# Patient Record
Sex: MALE | Race: WHITE | Employment: OTHER | URBAN - METROPOLITAN AREA
[De-identification: names, ages, dates, MRNs, and addresses within clinical notes are randomized per-mention and may not be internally consistent; named-entity substitution may affect disease eponyms.]

---

## 2020-09-25 ENCOUNTER — HOSPITAL ENCOUNTER (EMERGENCY)
Age: 30
Discharge: HOME | End: 2020-09-26
Attending: EMERGENCY MEDICINE
Payer: COMMERCIAL

## 2020-09-25 VITALS
DIASTOLIC BLOOD PRESSURE: 97 MMHG | WEIGHT: 220.9 LBS | BODY MASS INDEX: 32.72 KG/M2 | TEMPERATURE: 97.1 F | HEART RATE: 97 BPM | OXYGEN SATURATION: 98 % | HEIGHT: 69 IN | SYSTOLIC BLOOD PRESSURE: 155 MMHG | RESPIRATION RATE: 16 BRPM

## 2020-09-25 LAB
BASOPHILS ABSOLUTE: 0 K/UL (ref 0–0.2)
BASOPHILS RELATIVE PERCENT: 0.4 %
BILIRUBIN URINE: NEGATIVE
BLOOD, URINE: ABNORMAL
CLARITY: CLEAR
COLOR: YELLOW
EOSINOPHILS ABSOLUTE: 0.1 K/UL (ref 0–0.6)
EOSINOPHILS RELATIVE PERCENT: 1.8 %
GLUCOSE URINE: NEGATIVE MG/DL
HCT VFR BLD CALC: 44.1 % (ref 40.5–52.5)
HEMOGLOBIN: 15.3 G/DL (ref 13.5–17.5)
KETONES, URINE: NEGATIVE MG/DL
LEUKOCYTE ESTERASE, URINE: NEGATIVE
LYMPHOCYTES ABSOLUTE: 1.3 K/UL (ref 1–5.1)
LYMPHOCYTES RELATIVE PERCENT: 17.3 %
MCH RBC QN AUTO: 30.8 PG (ref 26–34)
MCHC RBC AUTO-ENTMCNC: 34.6 G/DL (ref 31–36)
MCV RBC AUTO: 89 FL (ref 80–100)
MICROSCOPIC EXAMINATION: YES
MONOCYTES ABSOLUTE: 0.4 K/UL (ref 0–1.3)
MONOCYTES RELATIVE PERCENT: 6.1 %
NEUTROPHILS ABSOLUTE: 5.4 K/UL (ref 1.7–7.7)
NEUTROPHILS RELATIVE PERCENT: 74.4 %
NITRITE, URINE: NEGATIVE
PDW BLD-RTO: 13.6 % (ref 12.4–15.4)
PH UA: 6 (ref 5–8)
PLATELET # BLD: 241 K/UL (ref 135–450)
PMV BLD AUTO: 6.4 FL (ref 5–10.5)
PROTEIN UA: NEGATIVE MG/DL
RBC # BLD: 4.95 M/UL (ref 4.2–5.9)
SPECIFIC GRAVITY UA: 1.02 (ref 1–1.03)
URINE TYPE: ABNORMAL
UROBILINOGEN, URINE: 0.2 E.U./DL
WBC # BLD: 7.2 K/UL (ref 4–11)

## 2020-09-25 PROCEDURE — 99283 EMERGENCY DEPT VISIT LOW MDM: CPT

## 2020-09-25 PROCEDURE — 36415 COLL VENOUS BLD VENIPUNCTURE: CPT

## 2020-09-25 PROCEDURE — 6370000000 HC RX 637 (ALT 250 FOR IP): Performed by: EMERGENCY MEDICINE

## 2020-09-25 PROCEDURE — 83690 ASSAY OF LIPASE: CPT

## 2020-09-25 PROCEDURE — 85025 COMPLETE CBC W/AUTO DIFF WBC: CPT

## 2020-09-25 PROCEDURE — 81001 URINALYSIS AUTO W/SCOPE: CPT

## 2020-09-25 PROCEDURE — 80053 COMPREHEN METABOLIC PANEL: CPT

## 2020-09-25 RX ORDER — QUETIAPINE FUMARATE 50 MG/1
50 TABLET, FILM COATED ORAL 2 TIMES DAILY
COMMUNITY

## 2020-09-25 RX ORDER — DICYCLOMINE HYDROCHLORIDE 10 MG/1
10 CAPSULE ORAL ONCE
Status: COMPLETED | OUTPATIENT
Start: 2020-09-25 | End: 2020-09-25

## 2020-09-25 RX ORDER — PANTOPRAZOLE SODIUM 40 MG/1
40 TABLET, DELAYED RELEASE ORAL ONCE
Status: COMPLETED | OUTPATIENT
Start: 2020-09-25 | End: 2020-09-25

## 2020-09-25 RX ADMIN — DICYCLOMINE HYDROCHLORIDE 10 MG: 10 CAPSULE ORAL at 23:44

## 2020-09-25 RX ADMIN — PANTOPRAZOLE SODIUM 40 MG: 40 TABLET, DELAYED RELEASE ORAL at 23:44

## 2020-09-25 ASSESSMENT — PAIN DESCRIPTION - DESCRIPTORS: DESCRIPTORS: CRAMPING

## 2020-09-25 ASSESSMENT — PAIN DESCRIPTION - FREQUENCY: FREQUENCY: INTERMITTENT

## 2020-09-25 ASSESSMENT — PAIN SCALES - GENERAL
PAINLEVEL_OUTOF10: 8
PAINLEVEL_OUTOF10: 7

## 2020-09-25 ASSESSMENT — PAIN DESCRIPTION - LOCATION: LOCATION: ABDOMEN

## 2020-09-25 ASSESSMENT — PAIN DESCRIPTION - ORIENTATION: ORIENTATION: OTHER (COMMENT)

## 2020-09-25 ASSESSMENT — PAIN DESCRIPTION - PAIN TYPE: TYPE: ACUTE PAIN

## 2020-09-26 LAB
A/G RATIO: 2 (ref 1.1–2.2)
ALBUMIN SERPL-MCNC: 4.8 G/DL (ref 3.4–5)
ALP BLD-CCNC: 62 U/L (ref 40–129)
ALT SERPL-CCNC: 69 U/L (ref 10–40)
ANION GAP SERPL CALCULATED.3IONS-SCNC: 14 MMOL/L (ref 3–16)
AST SERPL-CCNC: 40 U/L (ref 15–37)
BILIRUB SERPL-MCNC: 0.4 MG/DL (ref 0–1)
BUN BLDV-MCNC: 21 MG/DL (ref 7–20)
CALCIUM SERPL-MCNC: 9.7 MG/DL (ref 8.3–10.6)
CHLORIDE BLD-SCNC: 100 MMOL/L (ref 99–110)
CO2: 25 MMOL/L (ref 21–32)
CREAT SERPL-MCNC: 0.6 MG/DL (ref 0.9–1.3)
EPITHELIAL CELLS, UA: NORMAL /HPF (ref 0–5)
GFR AFRICAN AMERICAN: >60
GFR NON-AFRICAN AMERICAN: >60
GLOBULIN: 2.4 G/DL
GLUCOSE BLD-MCNC: 116 MG/DL (ref 70–99)
LIPASE: 24 U/L (ref 13–60)
POTASSIUM REFLEX MAGNESIUM: 4.1 MMOL/L (ref 3.5–5.1)
RBC UA: NORMAL /HPF (ref 0–4)
SODIUM BLD-SCNC: 139 MMOL/L (ref 136–145)
TOTAL PROTEIN: 7.2 G/DL (ref 6.4–8.2)
TRICHOMONAS: NORMAL /HPF
WBC UA: NORMAL /HPF (ref 0–5)

## 2020-09-26 RX ORDER — PANTOPRAZOLE SODIUM 20 MG/1
40 TABLET, DELAYED RELEASE ORAL DAILY
Qty: 30 TABLET | Refills: 0 | Status: ON HOLD | OUTPATIENT
Start: 2020-09-26 | End: 2021-08-14 | Stop reason: HOSPADM

## 2020-09-26 RX ORDER — DICYCLOMINE HYDROCHLORIDE 10 MG/1
10 CAPSULE ORAL EVERY 6 HOURS PRN
Qty: 20 CAPSULE | Refills: 0 | Status: ON HOLD | OUTPATIENT
Start: 2020-09-26 | End: 2021-08-13

## 2020-09-26 NOTE — ED PROVIDER NOTES
CHIEF COMPLAINT  Chief Complaint   Patient presents with    Abdominal Pain     pt c/o abd pain that started tonight after dinner and can not get it to go away       [facility]  Alex Leach is a 35 y.o. male who presents to the ED complaining of onset of periumbilical and epigastric abdominal pain that started after eating dinner tonight. Patient reports the pain is colicky and crampy and comes and goes. He reports he is currently asymptomatic. Patient reported no back pain. No testicle pain. No dysuria or hematuria. Patient had a normal bowel movement after the pain started without diarrhea, blood or melena. No fevers or chills. No chest pain or shortness of breath. No history of abdominal surgery. No other complaints, modifying factors or associated symptoms. Nursing notes reviewed. Past Medical History:   Diagnosis Date    ADHD (attention deficit hyperactivity disorder)     Autism     Depression     Fragile X syndrome     HT (hammer toe)     HTN (hypertension)     Tachycardia     Tourette syndrome      Past Surgical History:   Procedure Laterality Date    EYE SURGERY Left     TYMPANOSTOMY TUBE PLACEMENT  @ age 3     History reviewed. No pertinent family history.   Social History     Socioeconomic History    Marital status: Single     Spouse name: Not on file    Number of children: Not on file    Years of education: Not on file    Highest education level: Not on file   Occupational History    Not on file   Social Needs    Financial resource strain: Not on file    Food insecurity     Worry: Not on file     Inability: Not on file    Transportation needs     Medical: Not on file     Non-medical: Not on file   Tobacco Use    Smoking status: Never Smoker    Smokeless tobacco: Never Used   Substance and Sexual Activity    Alcohol use: No    Drug use: No    Sexual activity: Not on file   Lifestyle    Physical activity     Days per week: Not on file     Minutes per session: Not on file    Stress: Not on file   Relationships    Social connections     Talks on phone: Not on file     Gets together: Not on file     Attends Zoroastrianism service: Not on file     Active member of club or organization: Not on file     Attends meetings of clubs or organizations: Not on file     Relationship status: Not on file    Intimate partner violence     Fear of current or ex partner: Not on file     Emotionally abused: Not on file     Physically abused: Not on file     Forced sexual activity: Not on file   Other Topics Concern    Not on file   Social History Narrative    Not on file     No current facility-administered medications for this encounter. Current Outpatient Medications   Medication Sig Dispense Refill    dicyclomine (BENTYL) 10 MG capsule Take 1 capsule by mouth every 6 hours as needed (cramps) 20 capsule 0    pantoprazole (PROTONIX) 20 MG tablet Take 2 tablets by mouth daily 30 tablet 0    QUEtiapine (SEROQUEL) 100 MG tablet Take 80 mg by mouth 2 times daily      desvenlafaxine succinate (PRISTIQ) 100 MG TB24 extended release tablet Take 1 tablet by mouth daily 30 tablet 11    atomoxetine (STRATTERA) 80 MG capsule Take 1 capsule by mouth daily 30 capsule 11    fenofibrate 160 MG tablet Take 1 tablet by mouth daily 30 tablet 11    atenolol (TENORMIN) 25 MG tablet Take 1 tablet by mouth daily 30 tablet 5    butalbital-APAP-caffeine (FIORICET) -40 MG CAPS per capsule Take 1 capsule by mouth every 4 hours as needed for Headaches 30 capsule 1    promethazine (PHENERGAN) 25 MG tablet Take 1 tablet by mouth every 6 hours as needed for Nausea. 30 tablet 1    omeprazole (PRILOSEC) 20 MG capsule TAKE ONE CAPSULE BY MOUTH EVERY DAY 30 capsule 10     No Known Allergies    REVIEW OF SYSTEMS  Positives and pertinent negatives as per HPI. Ten other systems were reviewed and are negative. Nursing notes pertaining to ROS were reviewed.           PHYSICAL EXAM   BP (!) Hemoglobin 15.3 13.5 - 17.5 g/dL    Hematocrit 44.1 40.5 - 52.5 %    MCV 89.0 80.0 - 100.0 fL    MCH 30.8 26.0 - 34.0 pg    MCHC 34.6 31.0 - 36.0 g/dL    RDW 13.6 12.4 - 15.4 %    Platelets 322 898 - 979 K/uL    MPV 6.4 5.0 - 10.5 fL    Neutrophils % 74.4 %    Lymphocytes % 17.3 %    Monocytes % 6.1 %    Eosinophils % 1.8 %    Basophils % 0.4 %    Neutrophils Absolute 5.4 1.7 - 7.7 K/uL    Lymphocytes Absolute 1.3 1.0 - 5.1 K/uL    Monocytes Absolute 0.4 0.0 - 1.3 K/uL    Eosinophils Absolute 0.1 0.0 - 0.6 K/uL    Basophils Absolute 0.0 0.0 - 0.2 K/uL   Comprehensive Metabolic Panel w/ Reflex to MG   Result Value Ref Range    Sodium 139 136 - 145 mmol/L    Potassium reflex Magnesium 4.1 3.5 - 5.1 mmol/L    Chloride 100 99 - 110 mmol/L    CO2 25 21 - 32 mmol/L    Anion Gap 14 3 - 16    Glucose 116 (H) 70 - 99 mg/dL    BUN 21 (H) 7 - 20 mg/dL    CREATININE 0.6 (L) 0.9 - 1.3 mg/dL    GFR Non-African American >60 >60    GFR African American >60 >60    Calcium 9.7 8.3 - 10.6 mg/dL    Total Protein 7.2 6.4 - 8.2 g/dL    Alb 4.8 3.4 - 5.0 g/dL    Albumin/Globulin Ratio 2.0 1.1 - 2.2    Total Bilirubin 0.4 0.0 - 1.0 mg/dL    Alkaline Phosphatase 62 40 - 129 U/L    ALT 69 (H) 10 - 40 U/L    AST 40 (H) 15 - 37 U/L    Globulin 2.4 g/dL   Lipase   Result Value Ref Range    Lipase 24.0 13.0 - 60.0 U/L   Microscopic Urinalysis   Result Value Ref Range    WBC, UA 0-2 0 - 5 /HPF    RBC, UA 0-2 0 - 4 /HPF    Epithelial Cells, UA 0-1 0 - 5 /HPF    Trichomonas, UA None Seen None Seen /HPF         ED COURSE/MDM  Undifferentiated abdominal pain: Patient was given Bentyl and Protonix in the emergency room with essentially complete resolution of his symptoms. Differential diagnosis includes most likely gastritis, peptic ulcer disease, biliary colic, constipation. Patient will be given a prescription for Protonix and Bentyl for home and was advised to follow-up with [facility] surgery next 2 to 3 days if symptoms recur or are persistent. Patient does not have evidence of an acute abdomen on today's visit or other evidence of need for hospitalization. I estimate there is LOW risk for ACUTE APPENDICITIS, BOWEL OBSTRUCTION, CHOLECYSTITIS, DIVERTICULITIS, STRANGULATED HERNIA, PANCREATITIS, AAA,  TESTICULAR TORSION, PERFORATED BOWEL, PYELONEPHRITIS,  BOWEL ISCHEMIA, CARDIAC ISCHEMIA, INTRA-ABDOMINAL ABSCESS, thus I consider the discharge disposition reasonable. Also, there is no evidence or peritonitis, sepsis, or toxicity. We also discussed returning to the Emergency Department immediately if new or worsening symptoms occur. Hypertension:  Patient was hypertensive during the ER visit today. There are no signs of hypertensive emergency or evidence of end organ damage by history or physical exam.  These findings were discussed with the patient and advised to follow up with primary care physician to further assess and treat hypertension in the outpatient setting. The patient's blood pressure was found to be elevated according to CMS/Medicare and the Affordable Care Act/ObamaCare criteria. Elevated blood pressure could occur because of pain or anxiety or other reasons and does not mean that they need to have their blood pressure treated or medications otherwise adjusted. However, this could also be a sign that they will need to have their blood pressure treated or medications changed. The patient was instructed to take a list of recent blood pressure readings to their next visit with their personal physician. Patient was given scripts for the following medications. I counseled patient how to take these medications. New Prescriptions    DICYCLOMINE (BENTYL) 10 MG CAPSULE    Take 1 capsule by mouth every 6 hours as needed (cramps)    PANTOPRAZOLE (PROTONIX) 20 MG TABLET    Take 2 tablets by mouth daily         CLINICAL IMPRESSION  1.  Periumbilical abdominal pain        Blood pressure (!) 155/97, pulse 97, temperature 97.1 °F (36.2 °C), resp. rate 16, height 5' 9\" (1.753 m), weight 220 lb 14.4 oz (100.2 kg), SpO2 98 %.       Follow-up with:  Fay Matthews MD  [facility] [address] [phone]    In 2 days  If symptoms worsen    [facility]  [phone]  In 3 days  If symptoms worsen          Sourav Gaming MD  09/26/20 6846

## 2020-10-05 ENCOUNTER — HOSPITAL ENCOUNTER (OUTPATIENT)
Dept: ULTRASOUND IMAGING | Age: 30
Discharge: HOME | End: 2020-10-05
Payer: COMMERCIAL

## 2020-10-05 PROCEDURE — 76700 US EXAM ABDOM COMPLETE: CPT

## 2021-08-12 ENCOUNTER — HOSPITAL ENCOUNTER (INPATIENT)
Age: 31
LOS: 2 days | Discharge: HOME | DRG: 607 | End: 2021-08-14
Attending: EMERGENCY MEDICINE | Admitting: SPECIALIST
Payer: COMMERCIAL

## 2021-08-12 DIAGNOSIS — R21 ACUTE PURPURIC ERUPTION: Primary | ICD-10-CM

## 2021-08-12 DIAGNOSIS — R79.1 ELEVATED PARTIAL THROMBOPLASTIN TIME (PTT): ICD-10-CM

## 2021-08-12 LAB
ANION GAP SERPL CALCULATED.3IONS-SCNC: 14 MMOL/L (ref 3–16)
APTT: 43.9 SEC (ref 26.2–38.6)
BASOPHILS ABSOLUTE: 0.1 K/UL (ref 0–0.2)
BASOPHILS RELATIVE PERCENT: 1.1 %
BILIRUBIN URINE: NEGATIVE
BLOOD, URINE: ABNORMAL
BUN BLDV-MCNC: 20 MG/DL (ref 7–20)
CALCIUM SERPL-MCNC: 10.4 MG/DL (ref 8.3–10.6)
CHLORIDE BLD-SCNC: 104 MMOL/L (ref 99–110)
CLARITY: CLEAR
CO2: 20 MMOL/L (ref 21–32)
COLOR: YELLOW
CREAT SERPL-MCNC: 0.7 MG/DL (ref 0.9–1.3)
EOSINOPHILS ABSOLUTE: 0 K/UL (ref 0–0.6)
EOSINOPHILS RELATIVE PERCENT: 0.9 %
EPITHELIAL CELLS, UA: NORMAL /HPF (ref 0–5)
GFR AFRICAN AMERICAN: >60
GFR NON-AFRICAN AMERICAN: >60
GLUCOSE BLD-MCNC: 104 MG/DL (ref 70–99)
GLUCOSE URINE: NEGATIVE MG/DL
HCT VFR BLD CALC: 45.5 % (ref 40.5–52.5)
HEMOGLOBIN: 16.1 G/DL (ref 13.5–17.5)
INR BLD: 1.11 (ref 0.88–1.12)
KETONES, URINE: NEGATIVE MG/DL
LACTIC ACID: 0.8 MMOL/L (ref 0.4–2)
LEUKOCYTE ESTERASE, URINE: NEGATIVE
LYMPHOCYTES ABSOLUTE: 1.5 K/UL (ref 1–5.1)
LYMPHOCYTES RELATIVE PERCENT: 27 %
MCH RBC QN AUTO: 30.5 PG (ref 26–34)
MCHC RBC AUTO-ENTMCNC: 35.4 G/DL (ref 31–36)
MCV RBC AUTO: 86.2 FL (ref 80–100)
MICROSCOPIC EXAMINATION: YES
MONOCYTES ABSOLUTE: 0.4 K/UL (ref 0–1.3)
MONOCYTES RELATIVE PERCENT: 7.1 %
NEUTROPHILS ABSOLUTE: 3.5 K/UL (ref 1.7–7.7)
NEUTROPHILS RELATIVE PERCENT: 63.9 %
NITRITE, URINE: NEGATIVE
PDW BLD-RTO: 14.3 % (ref 12.4–15.4)
PH UA: 5.5 (ref 5–8)
PLATELET # BLD: 239 K/UL (ref 135–450)
PMV BLD AUTO: 6.6 FL (ref 5–10.5)
POTASSIUM REFLEX MAGNESIUM: 4.3 MMOL/L (ref 3.5–5.1)
PROTEIN UA: ABNORMAL MG/DL
PROTHROMBIN TIME: 12.7 SEC (ref 9.9–12.7)
RBC # BLD: 5.27 M/UL (ref 4.2–5.9)
RBC UA: NORMAL /HPF (ref 0–4)
SODIUM BLD-SCNC: 138 MMOL/L (ref 136–145)
SPECIFIC GRAVITY UA: >=1.03 (ref 1–1.03)
URINE REFLEX TO CULTURE: ABNORMAL
URINE TYPE: ABNORMAL
UROBILINOGEN, URINE: 0.2 E.U./DL
WBC # BLD: 5.5 K/UL (ref 4–11)
WBC UA: NORMAL /HPF (ref 0–5)

## 2021-08-12 PROCEDURE — 85610 PROTHROMBIN TIME: CPT

## 2021-08-12 PROCEDURE — 87040 BLOOD CULTURE FOR BACTERIA: CPT

## 2021-08-12 PROCEDURE — G0378 HOSPITAL OBSERVATION PER HR: HCPCS

## 2021-08-12 PROCEDURE — 83605 ASSAY OF LACTIC ACID: CPT

## 2021-08-12 PROCEDURE — 85025 COMPLETE CBC W/AUTO DIFF WBC: CPT

## 2021-08-12 PROCEDURE — 81001 URINALYSIS AUTO W/SCOPE: CPT

## 2021-08-12 PROCEDURE — 36415 COLL VENOUS BLD VENIPUNCTURE: CPT

## 2021-08-12 PROCEDURE — 2580000003 HC RX 258: Performed by: EMERGENCY MEDICINE

## 2021-08-12 PROCEDURE — 99284 EMERGENCY DEPT VISIT MOD MDM: CPT

## 2021-08-12 PROCEDURE — 80048 BASIC METABOLIC PNL TOTAL CA: CPT

## 2021-08-12 PROCEDURE — 85730 THROMBOPLASTIN TIME PARTIAL: CPT

## 2021-08-12 PROCEDURE — 1200000000 HC SEMI PRIVATE

## 2021-08-12 RX ORDER — 0.9 % SODIUM CHLORIDE 0.9 %
1000 INTRAVENOUS SOLUTION INTRAVENOUS ONCE
Status: COMPLETED | OUTPATIENT
Start: 2021-08-12 | End: 2021-08-12

## 2021-08-12 RX ADMIN — SODIUM CHLORIDE 1000 ML: 9 INJECTION, SOLUTION INTRAVENOUS at 21:17

## 2021-08-12 ASSESSMENT — PAIN SCALES - GENERAL
PAINLEVEL_OUTOF10: 9
PAINLEVEL_OUTOF10: 0

## 2021-08-12 ASSESSMENT — PAIN DESCRIPTION - DESCRIPTORS: DESCRIPTORS: DISCOMFORT

## 2021-08-12 ASSESSMENT — PAIN DESCRIPTION - LOCATION: LOCATION: LEG

## 2021-08-12 ASSESSMENT — PAIN DESCRIPTION - PAIN TYPE: TYPE: ACUTE PAIN

## 2021-08-12 ASSESSMENT — PAIN DESCRIPTION - ORIENTATION: ORIENTATION: LEFT;RIGHT;LOWER

## 2021-08-12 NOTE — ED NOTES
Red warm area noted to nilam lower legs inside area/ very tender/ no open skin/ peripheral pulses strong w brisk cap refill     Cady Pinedo RN  08/12/21 1949

## 2021-08-13 ENCOUNTER — APPOINTMENT (OUTPATIENT)
Dept: CT IMAGING | Age: 31
DRG: 607 | End: 2021-08-13
Payer: COMMERCIAL

## 2021-08-13 LAB
ANION GAP SERPL CALCULATED.3IONS-SCNC: 12 MMOL/L (ref 3–16)
BASOPHILS ABSOLUTE: 0 K/UL (ref 0–0.2)
BASOPHILS RELATIVE PERCENT: 1 %
BUN BLDV-MCNC: 17 MG/DL (ref 7–20)
C-REACTIVE PROTEIN: 8.9 MG/L (ref 0–5.1)
CALCIUM SERPL-MCNC: 9.6 MG/DL (ref 8.3–10.6)
CHLORIDE BLD-SCNC: 104 MMOL/L (ref 99–110)
CO2: 21 MMOL/L (ref 21–32)
CREAT SERPL-MCNC: 0.7 MG/DL (ref 0.9–1.3)
EOSINOPHILS ABSOLUTE: 0.1 K/UL (ref 0–0.6)
EOSINOPHILS RELATIVE PERCENT: 2.4 %
FIBRINOGEN: 317 MG/DL (ref 200–397)
GFR AFRICAN AMERICAN: >60
GFR NON-AFRICAN AMERICAN: >60
GLUCOSE BLD-MCNC: 119 MG/DL (ref 70–99)
HCT VFR BLD CALC: 44.4 % (ref 40.5–52.5)
HEMOGLOBIN: 15.7 G/DL (ref 13.5–17.5)
LYMPHOCYTES ABSOLUTE: 1.6 K/UL (ref 1–5.1)
LYMPHOCYTES RELATIVE PERCENT: 43.2 %
MCH RBC QN AUTO: 30.5 PG (ref 26–34)
MCHC RBC AUTO-ENTMCNC: 35.4 G/DL (ref 31–36)
MCV RBC AUTO: 86.1 FL (ref 80–100)
MONOCYTES ABSOLUTE: 0.4 K/UL (ref 0–1.3)
MONOCYTES RELATIVE PERCENT: 9.8 %
NEUTROPHILS ABSOLUTE: 1.7 K/UL (ref 1.7–7.7)
NEUTROPHILS RELATIVE PERCENT: 43.6 %
PDW BLD-RTO: 15 % (ref 12.4–15.4)
PLATELET # BLD: 208 K/UL (ref 135–450)
PMV BLD AUTO: 6.2 FL (ref 5–10.5)
POTASSIUM REFLEX MAGNESIUM: 3.7 MMOL/L (ref 3.5–5.1)
RBC # BLD: 5.16 M/UL (ref 4.2–5.9)
RHEUMATOID FACTOR: <10 IU/ML
SEDIMENTATION RATE, ERYTHROCYTE: 5 MM/HR (ref 0–15)
SODIUM BLD-SCNC: 137 MMOL/L (ref 136–145)
WBC # BLD: 3.8 K/UL (ref 4–11)

## 2021-08-13 PROCEDURE — 85732 THROMBOPLASTIN TIME PARTIAL: CPT

## 2021-08-13 PROCEDURE — 80048 BASIC METABOLIC PNL TOTAL CA: CPT

## 2021-08-13 PROCEDURE — 86147 CARDIOLIPIN ANTIBODY EA IG: CPT

## 2021-08-13 PROCEDURE — 86431 RHEUMATOID FACTOR QUANT: CPT

## 2021-08-13 PROCEDURE — 85384 FIBRINOGEN ACTIVITY: CPT

## 2021-08-13 PROCEDURE — 2580000003 HC RX 258: Performed by: SPECIALIST

## 2021-08-13 PROCEDURE — 6360000002 HC RX W HCPCS: Performed by: INTERNAL MEDICINE

## 2021-08-13 PROCEDURE — 85730 THROMBOPLASTIN TIME PARTIAL: CPT

## 2021-08-13 PROCEDURE — 6360000004 HC RX CONTRAST MEDICATION: Performed by: INTERNAL MEDICINE

## 2021-08-13 PROCEDURE — 85610 PROTHROMBIN TIME: CPT

## 2021-08-13 PROCEDURE — 86140 C-REACTIVE PROTEIN: CPT

## 2021-08-13 PROCEDURE — 2580000003 HC RX 258: Performed by: INTERNAL MEDICINE

## 2021-08-13 PROCEDURE — 6360000002 HC RX W HCPCS: Performed by: SPECIALIST

## 2021-08-13 PROCEDURE — 85613 RUSSELL VIPER VENOM DILUTED: CPT

## 2021-08-13 PROCEDURE — 85025 COMPLETE CBC W/AUTO DIFF WBC: CPT

## 2021-08-13 PROCEDURE — 99221 1ST HOSP IP/OBS SF/LOW 40: CPT | Performed by: INTERNAL MEDICINE

## 2021-08-13 PROCEDURE — 85652 RBC SED RATE AUTOMATED: CPT

## 2021-08-13 PROCEDURE — 1200000000 HC SEMI PRIVATE

## 2021-08-13 PROCEDURE — 6370000000 HC RX 637 (ALT 250 FOR IP): Performed by: INTERNAL MEDICINE

## 2021-08-13 PROCEDURE — 74177 CT ABD & PELVIS W/CONTRAST: CPT

## 2021-08-13 PROCEDURE — G0378 HOSPITAL OBSERVATION PER HR: HCPCS

## 2021-08-13 PROCEDURE — 96365 THER/PROPH/DIAG IV INF INIT: CPT

## 2021-08-13 PROCEDURE — 36415 COLL VENOUS BLD VENIPUNCTURE: CPT

## 2021-08-13 PROCEDURE — 83883 ASSAY NEPHELOMETRY NOT SPEC: CPT

## 2021-08-13 PROCEDURE — 84165 PROTEIN E-PHORESIS SERUM: CPT

## 2021-08-13 PROCEDURE — 96372 THER/PROPH/DIAG INJ SC/IM: CPT

## 2021-08-13 PROCEDURE — 85670 THROMBIN TIME PLASMA: CPT

## 2021-08-13 PROCEDURE — 86255 FLUORESCENT ANTIBODY SCREEN: CPT

## 2021-08-13 PROCEDURE — 84155 ASSAY OF PROTEIN SERUM: CPT

## 2021-08-13 PROCEDURE — 87449 NOS EACH ORGANISM AG IA: CPT

## 2021-08-13 PROCEDURE — 86038 ANTINUCLEAR ANTIBODIES: CPT

## 2021-08-13 PROCEDURE — 82232 ASSAY OF BETA-2 PROTEIN: CPT

## 2021-08-13 PROCEDURE — 96376 TX/PRO/DX INJ SAME DRUG ADON: CPT

## 2021-08-13 RX ORDER — SODIUM CHLORIDE 9 MG/ML
25 INJECTION, SOLUTION INTRAVENOUS PRN
Status: DISCONTINUED | OUTPATIENT
Start: 2021-08-13 | End: 2021-08-14 | Stop reason: HOSPADM

## 2021-08-13 RX ORDER — SODIUM CHLORIDE 0.9 % (FLUSH) 0.9 %
10 SYRINGE (ML) INJECTION PRN
Status: DISCONTINUED | OUTPATIENT
Start: 2021-08-13 | End: 2021-08-14 | Stop reason: HOSPADM

## 2021-08-13 RX ORDER — ATENOLOL 25 MG/1
25 TABLET ORAL DAILY
Status: DISCONTINUED | OUTPATIENT
Start: 2021-08-13 | End: 2021-08-14 | Stop reason: HOSPADM

## 2021-08-13 RX ORDER — SODIUM CHLORIDE 0.9 % (FLUSH) 0.9 %
10 SYRINGE (ML) INJECTION EVERY 12 HOURS SCHEDULED
Status: DISCONTINUED | OUTPATIENT
Start: 2021-08-13 | End: 2021-08-14 | Stop reason: HOSPADM

## 2021-08-13 RX ORDER — ONDANSETRON 2 MG/ML
4 INJECTION INTRAMUSCULAR; INTRAVENOUS EVERY 4 HOURS PRN
Status: DISCONTINUED | OUTPATIENT
Start: 2021-08-13 | End: 2021-08-14 | Stop reason: HOSPADM

## 2021-08-13 RX ORDER — GEMFIBROZIL 600 MG/1
600 TABLET, FILM COATED ORAL
COMMUNITY

## 2021-08-13 RX ORDER — ATOMOXETINE 40 MG/1
80 CAPSULE ORAL DAILY
Status: DISCONTINUED | OUTPATIENT
Start: 2021-08-13 | End: 2021-08-14 | Stop reason: HOSPADM

## 2021-08-13 RX ORDER — DICYCLOMINE HYDROCHLORIDE 10 MG/1
10 CAPSULE ORAL EVERY 6 HOURS PRN
Status: DISCONTINUED | OUTPATIENT
Start: 2021-08-13 | End: 2021-08-14 | Stop reason: HOSPADM

## 2021-08-13 RX ORDER — PANTOPRAZOLE SODIUM 40 MG/1
40 TABLET, DELAYED RELEASE ORAL
Status: DISCONTINUED | OUTPATIENT
Start: 2021-08-13 | End: 2021-08-14 | Stop reason: HOSPADM

## 2021-08-13 RX ORDER — QUETIAPINE FUMARATE 25 MG/1
50 TABLET, FILM COATED ORAL 2 TIMES DAILY
Status: DISCONTINUED | OUTPATIENT
Start: 2021-08-13 | End: 2021-08-14 | Stop reason: HOSPADM

## 2021-08-13 RX ORDER — ACETAMINOPHEN 650 MG/1
650 SUPPOSITORY RECTAL EVERY 4 HOURS PRN
Status: DISCONTINUED | OUTPATIENT
Start: 2021-08-13 | End: 2021-08-14 | Stop reason: HOSPADM

## 2021-08-13 RX ORDER — POLYETHYLENE GLYCOL 3350 17 G/17G
17 POWDER, FOR SOLUTION ORAL DAILY PRN
Status: DISCONTINUED | OUTPATIENT
Start: 2021-08-13 | End: 2021-08-14 | Stop reason: HOSPADM

## 2021-08-13 RX ORDER — DESVENLAFAXINE 50 MG/1
100 TABLET, EXTENDED RELEASE ORAL DAILY
Status: DISCONTINUED | OUTPATIENT
Start: 2021-08-13 | End: 2021-08-14 | Stop reason: HOSPADM

## 2021-08-13 RX ORDER — ACETAMINOPHEN 325 MG/1
650 TABLET ORAL EVERY 4 HOURS PRN
Status: DISCONTINUED | OUTPATIENT
Start: 2021-08-13 | End: 2021-08-14 | Stop reason: HOSPADM

## 2021-08-13 RX ORDER — PANTOPRAZOLE SODIUM 40 MG/1
40 TABLET, DELAYED RELEASE ORAL
Status: DISCONTINUED | OUTPATIENT
Start: 2021-08-13 | End: 2021-08-13 | Stop reason: SDUPTHER

## 2021-08-13 RX ADMIN — IOPAMIDOL 75 ML: 755 INJECTION, SOLUTION INTRAVENOUS at 17:29

## 2021-08-13 RX ADMIN — ATOMOXETINE HYDROCHLORIDE 80 MG: 40 CAPSULE ORAL at 11:28

## 2021-08-13 RX ADMIN — IOHEXOL 50 ML: 240 INJECTION, SOLUTION INTRATHECAL; INTRAVASCULAR; INTRAVENOUS; ORAL at 16:28

## 2021-08-13 RX ADMIN — PANTOPRAZOLE SODIUM 40 MG: 40 TABLET, DELAYED RELEASE ORAL at 06:27

## 2021-08-13 RX ADMIN — ENOXAPARIN SODIUM 40 MG: 40 INJECTION SUBCUTANEOUS at 08:39

## 2021-08-13 RX ADMIN — Medication 10 ML: at 21:24

## 2021-08-13 RX ADMIN — DESVENLAFAXINE SUCCINATE 100 MG: 50 TABLET, EXTENDED RELEASE ORAL at 11:28

## 2021-08-13 RX ADMIN — CEFAZOLIN SODIUM 2000 MG: 10 INJECTION, POWDER, FOR SOLUTION INTRAVENOUS at 16:13

## 2021-08-13 RX ADMIN — CEFAZOLIN SODIUM 2000 MG: 10 INJECTION, POWDER, FOR SOLUTION INTRAVENOUS at 06:49

## 2021-08-13 RX ADMIN — QUETIAPINE FUMARATE 50 MG: 25 TABLET ORAL at 21:24

## 2021-08-13 RX ADMIN — ATENOLOL 25 MG: 25 TABLET ORAL at 08:39

## 2021-08-13 RX ADMIN — Medication 10 ML: at 08:39

## 2021-08-13 RX ADMIN — QUETIAPINE FUMARATE 50 MG: 25 TABLET ORAL at 08:39

## 2021-08-13 ASSESSMENT — PAIN DESCRIPTION - ORIENTATION: ORIENTATION: RIGHT;LEFT

## 2021-08-13 ASSESSMENT — PAIN SCALES - GENERAL
PAINLEVEL_OUTOF10: 0

## 2021-08-13 ASSESSMENT — PAIN DESCRIPTION - PAIN TYPE
TYPE: ACUTE PAIN

## 2021-08-13 ASSESSMENT — PAIN DESCRIPTION - LOCATION: LOCATION: LEG

## 2021-08-13 NOTE — CONSULTS
Infectious Diseases Inpatient Consult Note      Reason for Consult:  Rash over the lower legs bi lateral     Requesting Physician:       Primary Care Physician:  MALIA Chisholm CNP    History Obtained From:  Epic and family     CHIEF COMPLAINT:     Chief Complaint   Patient presents with    Cellulitis     reports having cellulitis to nilam lower legs x 2 days         HISTORY OF PRESENT ILLNESS:  28 y.o. man with h/o Fragile X syndrome, hammertoes, hypertension, throat syndrome, ADHD, autism admitted to the hospital because of bilateral lower extremity rash tender, along the bilateral ankle area left worse than the right associated with some pain. 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 Patient denies any particular exposures no recent medication no allergies no fever or chills. No involvement of mucous membrane or eyelids or other part of the skin. Given the rash and lower extremity we are consulted for antibiotic recommendations  Location :  Left leg pain, rash around the ankle areas     Quality : aching      Severity :   5/10  Duration :   3-4 days   Timing : intermittent   Context : no trauma no new medications    Modifying factors : none  Associated signs and symptoms: no fevers no chills          Past Medical History:    Past Medical History:   Diagnosis Date    ADHD (attention deficit hyperactivity disorder)     Autism     Depression     Fragile X syndrome     HT (hammer toe)     HTN (hypertension)     Tachycardia     Tourette syndrome        Past Surgical History:    Past Surgical History:   Procedure Laterality Date    EYE SURGERY Left     TYMPANOSTOMY TUBE PLACEMENT  @ age 3       Current Medications:    No outpatient medications have been marked as taking for the 8/12/21 encounter [facility] Encounter). Allergies:  Patient has no known allergies.     Immunizations :   Immunization History   Administered Date(s) Administered    Influenza 10/01/2013 Social History:    Social History     Tobacco Use    Smoking status: Never Smoker    Smokeless tobacco: Never Used   Vaping Use    Vaping Use: Never used   Substance Use Topics    Alcohol use: No    Drug use: No     Social History     Tobacco Use   Smoking Status Never Smoker   Smokeless Tobacco Never Used      Family History  : no DVT no COPD       REVIEW OF SYSTEMS:      Constitutional:  negative for fevers, chills, night sweats  Eyes:  negative for blurred vision, eye discharge, visual disturbance   HEENT:  negative for hearing loss, ear drainage,nasal congestion  Respiratory:  negative for cough, shortness of breath or hemoptysis   Cardiovascular:  negative for chest pain, palpitations, syncope  Gastrointestinal:  negative for nausea, vomiting, diarrhea, constipation, abdominal pain  Genitourinary:  negative for frequency, dysuria, urinary incontinence, hematuria  Hematologic/Lymphatic:  negative for easy bruising, bleeding and lymphadenopathy  Allergic/Immunologic:  negative for recurrent infections, angioedema, anaphylaxis   Endocrine:  negative for weight changes, polyuria, polydipsia and polyphagia  Musculoskeletal:  negative for joint  pain, swelling, decreased range of motion  Integumentary: ++ rashes AROUND the ankle areas , skin lesions  Neurological:  negative for headaches, slurred speech, unilateral weakness  Psychiatric: negative for hallucinations,confusion,agitation.      PHYSICAL EXAM:      Vitals:    /84   Pulse 87   Temp 97.7 °F (36.5 °C) (Oral)   Resp 16   Ht 6' (1.829 m)   Wt 227 lb 9.6 oz (103.2 kg)   SpO2 97%   BMI 30.87 kg/m²     General Appearance: alert,in no acute distress, no pallor, no icterus dysmorphic features   Skin: warm and dry, no rash or erythema  Head: normocephalic and atraumatic  Eyes: pupils equal, round, and reactive to light, conjunctivae normal  ENT: tympanic membrane, external ear and ear canal normal bilaterally, nose without deformity, nasal mucosa and turbinates normal without polyps  Neck: supple and non-tender without mass, no thyromegaly  no cervical lymphadenopathy  Pulmonary/Chest: clear to auscultation bilaterally- no wheezes, rales or rhonchi, normal air movement, no respiratory distress  Cardiovascular: normal rate, regular rhythm, normal S1 and S2, no murmurs, rubs, clicks, or gallops, no carotid bruits  Abdomen: soft, non-tender, non-distended, normal bowel sounds, no masses or organomegaly  Extremities: no cyanosis, clubbing or edema  Musculoskeletal: normal range of motion, no joint swelling, deformity or tenderness  Integumentary: No rashes, no abnormal skin lesions, no petechiae  Neurologic: reflexes normal and symmetric, no cranial nerve deficit  Psych:  Orientation, sensorium, mood normal   Lines: IV  Rash around the ankle and lower par of the leg bi lateral and left worse than the right looks like Erythema nodosum as its fading away     DATA:    CBC:   Lab Results   Component Value Date    WBC 3.8 (L) 08/13/2021    HGB 15.7 08/13/2021    HCT 44.4 08/13/2021    MCV 86.1 08/13/2021     08/13/2021     RENAL:   Lab Results   Component Value Date    CREATININE 0.7 (L) 08/13/2021    BUN 17 08/13/2021     08/13/2021    K 3.7 08/13/2021     08/13/2021    CO2 21 08/13/2021     SED RATE:   Lab Results   Component Value Date    SEDRATE 5 08/13/2021     CK: No results found for: CKTOTAL  CRP:   Lab Results   Component Value Date    CRP 8.9 08/13/2021     Hepatic Function Panel:   Lab Results   Component Value Date    ALKPHOS 62 09/25/2020    ALT 69 09/25/2020    AST 40 09/25/2020    PROT 7.2 09/25/2020    PROT 7.2 01/14/2013    BILITOT 0.4 09/25/2020    LABALBU 4.8 09/25/2020     UA:  Lab Results   Component Value Date    COLORU Yellow 08/12/2021    CLARITYU Clear 08/12/2021    GLUCOSEU Negative 08/12/2021    BILIRUBINUR Negative 08/12/2021    KETUA Negative 08/12/2021    SPECGRAV >=1.030 08/12/2021    BLOODU TRACE-INTACT 08/12/2021    PHUR 5.5 08/12/2021    PROTEINU TRACE 08/12/2021    UROBILINOGEN 0.2 08/12/2021    NITRU Negative 08/12/2021    LEUKOCYTESUR Negative 08/12/2021    LABMICR YES 08/12/2021    URINETYPE NotGiven 08/12/2021      Urine Microscopic:   Lab Results   Component Value Date    WBCUA 0-2 08/12/2021    RBCUA 3-4 08/12/2021    EPIU 2-5 08/12/2021     Urine Reflex to Culture:   Lab Results   Component Value Date    URRFLXCULT Not Indicated 08/12/2021         MICRO: cultures reviewed and updated by me   12/21 2115       Order Status: Sent Specimen: Blood Updated: 08/13/21 0908   Culture, Blood 1 [[id number]] Collected: 08/12/21 2110   Order Status: Sent Specimen: Blood Updated: 08/13/21 0908         Blood Culture: No results found for: BC, BLOODCULT2    Viral Culture:    No results found for: COVID19  Urine Culture: No results for input(s): Sharri Benjamin in the last 72 hours. Scheduled Meds:   pantoprazole  40 mg Oral QAM AC    desvenlafaxine succinate  100 mg Oral Daily    atomoxetine  80 mg Oral Daily    atenolol  25 mg Oral Daily    sodium chloride flush  10 mL Intravenous 2 times per day    enoxaparin  40 mg Subcutaneous Daily    QUEtiapine  50 mg Oral BID    ceFAZolin  2,000 mg Intravenous 2 times per day       Continuous Infusions:   sodium chloride         PRN Meds:  dicyclomine, sodium chloride flush, sodium chloride, ondansetron, polyethylene glycol, acetaminophen **OR** acetaminophen    Imaging:   No orders to display       All pertinent images and reports for the current Hospitalization were reviewed by me.     IMPRESSION:    Patient Active Problem List   Diagnosis    ADHD (attention deficit hyperactivity disorder)    Tourette's syndrome    Tachycardia    GERD (gastroesophageal reflux disease)    HTN (hypertension)    Dysthymia    Cellulitis of nose    MRSA (methicillin resistant staph aureus) culture positive    Insomnia    Obesity    Abscess of nose    Rash     Rash bi lateral lower legs around the ankle area  Left leg area some what tender  CBC with diff normal  No exposures no new drugs  Rash appears to be like  Erythema Nodosum    He has no cellulitis and we can d/c IV abx and rash may be non specific as per mother he had no new medications, no allergies nor  toxins        Labs, Microbiology, Radiology and pertinent results from current hospitalization and care every where were reviewed by me as a part of the consultation. PLAN :  1. D/C Cefazolin   2. Cont local care  3. ,May need skin biopsy if  He wants to   to pursue   4. Can try low dose steroids if pain recurs    Will sign off -     Discussed with patient/Family and Nursing     Thanks for allowing me to participate in your patient's care please call me with any questions or concerns.     Dr. Julian Hernández MD  [address] [place] [address] Physician  Phone: [phone]   Fax : [phone]

## 2021-08-13 NOTE — ED NOTES
MD Zimmer into see pt and discuss plan of care and answer pt/family questions /     Shoaib Madsen RN  08/12/21 9671

## 2021-08-13 NOTE — H&P
[address] [place] [address] [facility]                              HISTORY AND PHYSICAL    PATIENT NAME: Abhishek Vaughn                    :        1990  MED REC NO:   [id number]                          ROOM:       [id number]  ACCOUNT NO:   [de-identified]                           ADMIT DATE: 2021  PROVIDER:     Shandra Ascencio MD    ATTENDING PROVIDER:  Shandra Ascencio MD    CHIEF COMPLAINT:  Rash on both lower extremities above the ankle. HISTORY OF PRESENT ILLNESS:  This 62-year-old male patient came to the  emergency room because of rash on the lower extremities above the  ankles. It is quite circumscribed like a purpuric type of rash. It  started on Tuesday, Wednesday, Thursday it got worse and the patient  came to the emergency room for evaluation. The patient is working in  [employer]. No exposure to any chemicals or any bug bites. The  patient has no fever, no other rash, just mostly on the lower  extremities. The patient denies any upper respiratory infection,  abdominal pain. No hematuria. No melena. PAST MEDICAL HISTORY:  Significant for fragile X syndrome, hammertoe,  hypertension, Tourette syndrome, ADHD, autism, and depression. ALLERGIES:  No known allergies. MEDICATIONS:  See the reconciliation sheet. No current facility-administered medications for this encounter.      Current Outpatient Medications   Medication Sig Dispense Refill    dicyclomine (BENTYL) 10 MG capsule Take 1 capsule by mouth every 6 hours as needed (cramps) 20 capsule 0    omeprazole (PRILOSEC) 20 MG delayed release capsule Take 1 capsule by mouth Daily 30 capsule 10    gemfibrozil (LOPID) 600 MG tablet Take 600 mg by mouth 2 times daily (before meals)      QUEtiapine (SEROQUEL) 50 MG tablet Take 50 mg by mouth 2 times daily       desvenlafaxine succinate (PRISTIQ) 100 MG TB24 extended release tablet Take 1 tablet by mouth daily 30 tablet 11    atomoxetine (STRATTERA) 80 MG capsule Take 1 capsule by mouth daily 30 capsule 11    atenolol (TENORMIN) 25 MG tablet Take 1 tablet by mouth daily 30 tablet 5       FAMILY HISTORY:  Significant for the patient's mother had some TTP. SOCIAL HISTORY:  The patient is single, lives with mom. Nonsmoker. Nondrinker. REVIEW OF SYSTEMS:  No headache. No visual symptom. No swallowing  problem. No confusion. No chest pain. No palpitation. No pleuritic  pain. No cough. No nausea or vomiting. No abdominal pain. No  hematuria. No melena. No weight loss. PHYSICAL EXAMINATION:  VITAL SIGNS:  The patient's blood pressure was 142/83, followup blood  pressure was 138/86; respiratory rate was 16, pulse 101, temperature  98.8, the patient weighed 226 pounds, saturation 100% on room air. GENERAL:  The patient is alert and oriented, well developed, well  nourished, moderately obese. SKIN:  On the lower extremities on both sides, circumscribed purpuric  rash about the ankles. HEENT:  Head:  Normocephalic, atraumatic. Pupils are equal, both sides,  reactive to light and accommodation. Ears, Nose, and Throat: Within  normal limits. Mucous membranes are moist.  NECK:  Supple. No JVD. No lymphadenopathy. No thyromegaly. LUNGS:  Clear bilaterally. HEART:  S1, S2.  Regular rhythm. ABDOMEN:  Obese, soft. Bowel sounds present. No mass. No  hepatosplenomegaly. EXTREMITIES:  Above the ankles and on the dorsal part of the leg, about  a 10-cm round purpuric-type of rash on both sides, symmetrical.  One is  more confluent on the left side than the other one. No other rash above  that. NEUROLOGIC:  The patient has autism and history of depression. Otherwise, the patient is alert and oriented. LABORATORY STUDIES:  WBC count was 3.8, hemoglobin 15.7, hematocrit  44.4, platelet count 134. Sodium 138, potassium 4.3, chloride 104, CO2  of 20, BUN 20, creatinine 0.7, glucose 104. Lactic acid 0.8. Differential is within normal limits. The patient's PTT was 43.9, INR  is 1.1, PT was 12.7. Urine is negative. ASSESSMENT:  Acute purpuric rash on both lower extremities, maybe mild  cellulitis, autism, ADHD, fragile X chromosome, Tourette syndrome,  borderline hypertension. PLAN:  Get Hematology and ID consult. Resume the home medication. Started on Keflex and the rest will depend upon the Hematology and ID  consult assessment. I spent more than 30 minutes on face-to-face evaluation with the  patient. I discussed the management and findings with the patient and  nursing staff.         Bubba Way MD    D: 08/13/2021 6:27:08       T: 08/13/2021 11:28:43     JOSE [email or web address]  Job#: [id number]     Doc#: [id number]    CC:

## 2021-08-13 NOTE — PLAN OF CARE
Problem: Infection:  Goal: Will remain free from infection  Description: Will remain free from infection  8/13/2021 1155 by Pricila Razo RN  Outcome: Ongoing     Problem: Safety:  Goal: Free from accidental physical injury  Description: Free from accidental physical injury  8/13/2021 1155 by Pricila Razo RN  Outcome: Ongoing  Note: Patient remains free from accidental injury. Precautions taken to ensure safe environment including bed in lowest position, wheels locked, and call light within reach. Measures taken to prevent accidental needle sticks and proper patient Identification. 8/13/2021 0351 by Babs Prince RN  Outcome: Ongoing     Problem: Safety:  Goal: Free from intentional harm  Description: Free from intentional harm  8/13/2021 1155 by Pricila Razo RN  Outcome: Ongoing     Problem: Daily Care:  Goal: Daily care needs are met  Description: Daily care needs are met  8/13/2021 1155 by Pricila Razo RN  Outcome: Ongoing  8/13/2021 0351 by Babs Prince RN  Outcome: Ongoing     Problem: Pain:  Goal: Patient's pain/discomfort is manageable  Description: Patient's pain/discomfort is manageable  8/13/2021 1155 by Pricila Razo RN  Outcome: Ongoing  Note: Pt able to express presence and absence of pain using numerical pain scale. Pt pain is managed by PRN analgesics as ordered by MD. Pain reassess after each interventions. Will continue to monitor as needed. Problem: Skin Integrity:  Goal: Skin integrity will stabilize  Description: Skin integrity will stabilize  8/13/2021 1155 by Pricila Razo RN  Outcome: Ongoing  Note: Skin assessment performed each shift per protocol. Pt encouraged to reposition often. Will continue to monitor and assess for skin breakdown.      8/13/2021 0351 by Babs Prince RN  Outcome: Ongoing     Problem: Discharge Planning:  Goal: Patients continuum of care needs are met  Description: Patients continuum of care needs are met  8/13/2021 1155 by Charles Bennett RN  Outcome: Ongoing  8/13/2021 0351 by Narda Youngblood RN  Outcome: Ongoing

## 2021-08-13 NOTE — PROGRESS NOTES
4 Eyes Skin Assessment     NAME:  Tarun Rodriguez  YOB: 1990  MEDICAL RECORD NUMBER:  [id number]    The patient is being assess for  Admission    I agree that 2 RN's have performed a thorough Head to Toe Skin Assessment on the patient. ALL assessment sites listed below have been assessed. Areas assessed by both nurses:    Head, Face, Ears, Shoulders, Back, Chest, Arms, Elbows, Hands, Sacrum. Buttock, Coccyx, Ischium and Legs. Feet and Heels        Does the Patient have a Wound?  No noted wound(s)       Carlos Prevention initiated:  No   Wound Care Orders initiated:  No    Pressure Injury (Stage 3,4, Unstageable, DTI, NWPT, and Complex wounds) if present place consult order under [de-identified] No    New and Established Ostomies if present place consult order under : NA      Nurse 1 eSignature: Electronically signed by Sravanthi Thompson RN on 8/13/21 at 4:00 AM EDT    **SHARE this note so that the co-signing nurse is able to place an eSignature**    Nurse 2 eSignature: {Esignature:[id number]}

## 2021-08-13 NOTE — CARE COORDINATION
SW attempted to reach patient via telephone to complete intake assessment, however, there was no answer. PAULINE will try again later if time permits. Respectfully submitted,    Alina [email or web address]  [facility]   [phone]    Electronically signed by BRANDIE Sprague on 8/13/2021 at 4:52 PM

## 2021-08-13 NOTE — ED NOTES
Urinal @ bedside/ aware of need for clean catch urine specimen.  Pt and family aware of [facility] plan[facility]process [facility]  [facility]  08/12/21 1155

## 2021-08-13 NOTE — PROGRESS NOTES
Pt awake in bed. Pt denies any pain or discomfort on lower legs. BLE noted to have redness/rash and it is warm at the reddened site. Pt's scheduled meds are given as ordered. Pt denies other needs at present. Call light in reach. Electronically signed by Sadie Maciel RN on 8/13/2021 at 11:54 AM

## 2021-08-13 NOTE — CARE COORDINATION
8/13 Patient not in room for initial assessment.  Electronically signed by Librado Schlatter, RN on 8/13/2021 at 4:26 PM

## 2021-08-13 NOTE — ED NOTES
MD Zimmer into discuss testing results and admission plan/process w pt and family/ pt agrees with plan of care.       Cady Pinedo RN  08/12/21 3946

## 2021-08-13 NOTE — ED PROVIDER NOTES
CHIEF COMPLAINT  Cellulitis (reports having cellulitis to nilam lower legs x 2 days)      HISTORY OF PRESENT ILLNESS  Yovany Vásquez is a 28 y.o. male who  has a past medical history of ADHD (attention deficit hyperactivity disorder), Autism, Depression, Fragile X syndrome, HT (hammer toe), HTN (hypertension), Tachycardia, and Tourette syndrome. presents to the ED with his mother for concerns of possible cellulitis bilateral lower extremities. Mother states that over the past 2 days that she has noticed redness on the medial aspect of his ankles bilaterally. States that the patient has been telling her the area of redness is a burning sensation. Mother states that they spoke with a relative who works at a medical office and was told it was possibly cellulitis. No fevers. No trauma to the area. Does have a few scabs in the area from previous bug bites but denies any recent insect bites or injuries. No other bruising or lesions on the body. No new medications. No recent antibiotics. Denies any recent upper respiratory infection, nasal congestion, cough or nausea and vomiting. No other complaints, modifying factors or associated symptoms. Nursing notes reviewed. Past Medical History:   Diagnosis Date    ADHD (attention deficit hyperactivity disorder)     Autism     Depression     Fragile X syndrome     HT (hammer toe)     HTN (hypertension)     Tachycardia     Tourette syndrome      Past Surgical History:   Procedure Laterality Date    EYE SURGERY Left     TYMPANOSTOMY TUBE PLACEMENT  @ age 3     History reviewed. No pertinent family history.   Social History     Socioeconomic History    Marital status: Single     Spouse name: Not on file    Number of children: Not on file    Years of education: Not on file    Highest education level: Not on file   Occupational History    Not on file   Tobacco Use    Smoking status: Never Smoker    Smokeless tobacco: Never Used   Vaping Use    Vaping Use: Never used   Substance and Sexual Activity    Alcohol use: No    Drug use: No    Sexual activity: Not on file   Other Topics Concern    Not on file   Social History Narrative    Not on file     Social Determinants of Health     Financial Resource Strain:     Difficulty of Paying Living Expenses:    Food Insecurity:     Worried About Running Out of Food in the Last Year:     [address] in the Last Year:    Transportation Needs:     Lack of Transportation (Medical):  Lack of Transportation (Non-Medical):    Physical Activity:     Days of Exercise per Week:     Minutes of Exercise per Session:    Stress:     Feeling of Stress :    Social Connections:     Frequency of Communication with Friends and Family:     Frequency of Social Gatherings with Friends and Family:     Attends [employer] Services:     Active Member of Clubs or Organizations:     Attends Club or Organization Meetings:     Marital Status:    Intimate Partner Violence:     Fear of Current or Ex-Partner:     Emotionally Abused:     Physically Abused:     Sexually Abused:      No current facility-administered medications for this encounter.      Current Outpatient Medications   Medication Sig Dispense Refill    dicyclomine (BENTYL) 10 MG capsule Take 1 capsule by mouth every 6 hours as needed (cramps) 20 capsule 0    pantoprazole (PROTONIX) 20 MG tablet Take 2 tablets by mouth daily 30 tablet 0    QUEtiapine (SEROQUEL) 100 MG tablet Take 80 mg by mouth 2 times daily      desvenlafaxine succinate (PRISTIQ) 100 MG TB24 extended release tablet Take 1 tablet by mouth daily 30 tablet 11    atomoxetine (STRATTERA) 80 MG capsule Take 1 capsule by mouth daily 30 capsule 11    fenofibrate 160 MG tablet Take 1 tablet by mouth daily 30 tablet 11    atenolol (TENORMIN) 25 MG tablet Take 1 tablet by mouth daily 30 tablet 5    butalbital-APAP-caffeine (FIORICET) -40 MG CAPS per capsule Take 1 capsule by mouth every 4 hours as needed for Headaches 30 capsule 1    promethazine (PHENERGAN) 25 MG tablet Take 1 tablet by mouth every 6 hours as needed for Nausea. 30 tablet 1    omeprazole (PRILOSEC) 20 MG capsule TAKE ONE CAPSULE BY MOUTH EVERY DAY 30 capsule 10     No Known Allergies      REVIEW OF SYSTEMS  10 systems reviewed, pertinent positives per HPI otherwise noted to be negative    PHYSICAL EXAM  BP (!) 144/95   Pulse 85   Temp 98.8 °F (37.1 °C) (Oral)   Resp 16   Ht 6' (1.829 m)   Wt 226 lb 3.1 oz (102.6 kg)   SpO2 99%   BMI 30.68 kg/m²      CONSTITUTIONAL: AOx4, cooperative with exam, afebrile   HEAD: normocephalic, atraumatic   EYES: PERRL, EOMI, anicteric sclera   ENT: Moist mucous membranes, uvula midline, no swelling of the posterior pharynx   NECK: Supple, symmetric, trachea midline, no meningismus   LUNGS: Bilateral breath sounds, CTAB, no rales/ronchi/wheezes   CARDIOVASCULAR: RRR, normal S1/S2, no m/r/g, 2+ pulses throughout   ABDOMEN: Soft, non-tender, non-distended, +BS   NEUROLOGIC:  MAEx4, 5/5 strength throughout; fine touch sensation intact throughout; GCS 15   MUSCULOSKELETAL: No clubbing, cyanosis or edema   SKIN:  Nonblanching tender palpable purpura medial ankles bilaterally left worse than the right, please see images below            RADIOLOGY  X-RAYS:  I have reviewed radiologic plain film image(s). ALL OTHER NON-PLAIN FILM IMAGES SUCH AS CT, ULTRASOUND AND MRI HAVE BEEN READ BY THE RADIOLOGIST. No orders to display          EKG INTERPRETATION  None    PROCEDURES    ED COURSE/MDM  ITP, TTP, vasculitis  Patient seen and evaluated. History and physical as above. Nontoxic, afebrile. Patient presents with purpura on the bilateral medial ankles is been present over the past 2 days. Lesions are nonblanching and tender. No other bruising or bleeding episodes per patient or mother. Mother does have history of thrombocytopenia herself although patient has no history himself.   They deny any recent illnesses. Plan for routine labs, liver enzymes, metabolic panel, coags, urinalysis and likely admission. ED Course as of Aug 13 0124   Thu Aug 12, 2021   2215 Patient's platelet count normal at 239. PTT slightly elevated at 43.9. PT/INR normal.  Updated patient and mother on results. Discussed admission for further work-up of patient's purpura and vasculitis. Mother agreeable. Spoke with Dr. Edgardo Torres, hospitalist at [facility] regarding patient's care plan and admission. Admission was accepted. [DS]      ED Course User Index  [DS] Rima Haines MD         Patient was given scripts for the following medications. I counseled patient how to take these medications. New Prescriptions    No medications on file     CRITICAL CARE TIME   Total Critical Care time was 20 minutes, excluding separately reportable procedures. There was a high probability of clinically significant/life threatening deterioration in the patient's condition which required my urgent intervention. CLINICAL IMPRESSION  1. Acute purpuric eruption    2. Elevated partial thromboplastin time (PTT)        Blood pressure (!) 144/95, pulse 85, temperature 98.8 °F (37.1 °C), temperature source Oral, resp. rate 16, height 6' (1.829 m), weight 226 lb 3.1 oz (102.6 kg), SpO2 99 %. DISPOSITION  Admitted      Disclaimer: All medical record entries made by [address] [place] [address] [address] [facility].       (Please note that this note was completed with a voice recognition program. Every attempt was made to edit the dictations, but inevitably there remain words that are mis-transcribed.)            Rima Haines MD  08/13/21 0127

## 2021-08-13 NOTE — PLAN OF CARE
Problem: Infection:  Goal: Will remain free from infection  Description: Will remain free from infection  Outcome: Ongoing     Problem: Pain:  Goal: Patient's pain/discomfort is manageable  Description: Patient's pain/discomfort is manageable  Outcome: Ongoing     Problem: Skin Integrity:  Goal: Skin integrity will stabilize  Description: Skin integrity will stabilize  Outcome: Ongoing

## 2021-08-13 NOTE — CONSULTS
[facility]    Consult Note      Requesting Physician: Avila Graham   CHIEF COMPLAINT: Rash on bilateral legs , elevated ptt       HISTORY OF PRESENT ILLNESS:      Mr. Eli Monsalve  is a 28 y.o. male we are seeing in consultation for a described rash of \"purpura\" and an elevated ptt.  THe pt came in after he developed an irregular bilateral erythematous patch on his legs  It is tender somewhat to palpation   It does not kali and its minorly raised   He denies recent fevers/sick contacts-he may have had a brief episode of diarrhea but not bloody or persistant  The pts cbc is normal with a normal plt count  Denies new abx or meds -no change in meds  No joint swelling   NO dysuria sore throat/urine color changes or abd pain   NO cough or sob   NO sun sensitivity   He has baseline health issues related to fragile x/autism/tourettes  He works at a store  He has no animal bites  No travel no night sweats or adenopathy   HE has no spontaneous bleeding   No hx of surgical bleeding   Pt had an elevated ptt in er ,normal pt   No headaches neck stiffness or neurologic complaints     Past Medical History:        Diagnosis Date    ADHD (attention deficit hyperactivity disorder)     Autism     Depression     Fragile X syndrome     HT (hammer toe)     HTN (hypertension)     Tachycardia     Tourette syndrome      Past Surgical History:        Procedure Laterality Date    EYE SURGERY Left     TYMPANOSTOMY TUBE PLACEMENT  @ age 3       Current Medications:    Current Facility-Administered Medications: pantoprazole (PROTONIX) tablet 40 mg, 40 mg, Oral, QAM AC  dicyclomine (BENTYL) capsule 10 mg, 10 mg, Oral, Q6H PRN  desvenlafaxine succinate (PRISTIQ) extended release tablet 100 mg, 100 mg, Oral, Daily  atomoxetine (STRATTERA) capsule 80 mg, 80 mg, Oral, Daily  atenolol (TENORMIN) tablet 25 mg, 25 mg, Oral, Daily  sodium chloride flush 0.9 % injection 10 mL, 10 mL, Intravenous, 2 times per day  sodium chloride flush 0.9 % injection 10 mL, 10 mL, Intravenous, PRN  0.9 % sodium chloride infusion, 25 mL, Intravenous, PRN  ondansetron (ZOFRAN) injection 4 mg, 4 mg, Intravenous, Q4H PRN  polyethylene glycol (GLYCOLAX) packet 17 g, 17 g, Oral, Daily PRN  acetaminophen (TYLENOL) tablet 650 mg, 650 mg, Oral, Q4H PRN **OR** acetaminophen (TYLENOL) suppository 650 mg, 650 mg, Rectal, Q4H PRN  enoxaparin (LOVENOX) injection 40 mg, 40 mg, Subcutaneous, Daily  QUEtiapine (SEROQUEL) tablet 50 mg, 50 mg, Oral, BID  iohexol (OMNIPAQUE 240) injection 50 mL, 50 mL, Intravenous, ONCE PRN  iohexol (OMNIPAQUE 240) 240 MG/ML injection, , ,   Allergies:  Patient has no known allergies. Social History:      Social History     Socioeconomic History    Marital status: Single     Spouse name: Not on file    Number of children: Not on file    Years of education: Not on file    Highest education level: Not on file   Occupational History    Not on file   Tobacco Use    Smoking status: Never Smoker    Smokeless tobacco: Never Used   Vaping Use    Vaping Use: Never used   Substance and Sexual Activity    Alcohol use: No    Drug use: No    Sexual activity: Not on file   Other Topics Concern    Not on file   Social History Narrative    Not on file     Social Determinants of Health     Financial Resource Strain:     Difficulty of Paying Living Expenses:    Food Insecurity:     Worried About Running Out of Food in the Last Year:     [address] in the Last Year:    Transportation Needs:     Lack of Transportation (Medical):      Lack of Transportation (Non-Medical):    Physical Activity:     Days of Exercise per Week:     Minutes of Exercise per Session:    Stress:     Feeling of Stress :    Social Connections:     Frequency of Communication with Friends and Family:     Frequency of Social Gatherings with Friends and Family:     Attends [employer] Services:     Active Member of Clubs or Organizations:     Attends [employer] or Organization Meetings:     Marital Status:    Intimate Partner Violence:     Fear of Current or Ex-Partner:     Emotionally Abused:     Physically Abused:     Sexually Abused:           Family History:     History reviewed. No pertinent family history. REVIEW OF SYSTEMS:    ROS per the HPI   Otherwise  10 point ROS negative     PHYSICAL EXAM:      Vitals:  /80   Pulse 84   Temp 98.3 °F (36.8 °C) (Oral)   Resp 18   Ht 6' (1.829 m)   Wt 227 lb 9.6 oz (103.2 kg)   SpO2 96%   BMI 30.87 kg/m²     CONSTITUTIONAL:  awake, alert, cooperative, no apparent distress, and appears stated age NAD  EYES:  pupils equal, round and reactive to light, extra ocular muscles intact, sclera clear, conjunctiva normal  NECK:  Supple, symmetrical, trachea midline, no adenopathy, thyroid symmetric, not enlarged and no tenderness, skin normal  HEMATOLOGIC/LYMPHATICS:  no cervical lymphadenopathy, no supraclavicular lymphadenopathy, no axillary lymphadenopathyLUNGS:  No increased work of breathing, good air exchange, clear to auscultation bilaterally, no crackles or wheezing  CARDIOVASCULAR:  , regular rate and rhythm, normal S1 and S2, no S3 or S4, and no murmur noted  ABDOMEN:  No scars, normal bowel sounds, soft, non-distended, non-tender, no masses palpated, no hepatosplenomegally      MUSCULOSKELETAL:  There is no redness, warmth, or swelling of the joints. Full range of motion noted. Motor strength is 5 out of 5 all extremities bilaterally. NEUROLOGIC:  Awake, alert, oriented to name, place and time. Cranial nerves II-XII are grossly intact. Motor is 5 out of 5 bilaterally.   SKIN:bilateral patchy irregular erythema -not raised or open-somewhat painful to touch   DATA:    PT/INR:    Recent Labs     08/12/21 2115 08/13/21 1624 08/13/21  1625   PROT  --  7.2  --    INR 1.11  --  1.10     PTT:    Recent Labs     08/12/21 2115 08/13/21 1625   APTT 43.9* 46.8*     CMP:    Lab Results   Component Value Date    NA 137 08/13/2021    K 3.7 08/13/2021     08/13/2021    CO2 21 08/13/2021    BUN 17 08/13/2021    PROT 7.2 08/13/2021    PROT 7.2 01/14/2013     Magnesium:  No results found for: MG  Phosphorus:  No components found for: PO4  Calcium:  No components found for: CA  CBC:    Lab Results   Component Value Date    WBC 3.8 08/13/2021    RBC 5.16 08/13/2021    HGB 15.7 08/13/2021    HCT 44.4 08/13/2021    MCV 86.1 08/13/2021    RDW 15.0 08/13/2021     08/13/2021     DIFF:    Lab Results   Component Value Date    MCV 86.1 08/13/2021    RDW 15.0 08/13/2021      LDH:  @labcrnt(LDH)@  Uric Acid:  @labcrnt(URIC)@    Radiology Review: CT CHEST ABDOMEN PELVIS W CONTRAST    Result Date: 8/13/2021  EXAMINATION: CT OF THE CHEST, ABDOMEN, AND PELVIS WITH CONTRAST 8/13/2021 11:19 am TECHNIQUE: CT of the chest, abdomen and pelvis was performed with the administration of intravenous contrast. Multiplanar reformatted images are provided for review. Dose modulation, iterative reconstruction, and/or weight based adjustment of the mA/kV was utilized to reduce the radiation dose to as low as reasonably achievable. COMPARISON: None HISTORY: ORDERING SYSTEM PROVIDED HISTORY: eval for adenopathy -may have erythema nodosum TECHNOLOGIST PROVIDED HISTORY: Reason for exam:->eval for adenopathy -may have erythema nodosum Additional Contrast?->Oral Reason for Exam: eval for adenopathy -may have erythema nodosum Acuity: Acute Type of Exam: Initial FINDINGS: Chest: Mediastinum: No pathological adenopathy is present within the mediastinum. No acute abnormalities present involving the thoracic aorta or heart. Thyroid gland appears normal.  Main pulmonary artery is normal in caliber. Lungs/pleura: No focal area of consolidation, pleural effusion, or pneumothorax is present. No suspicious lung nodules are present. Soft Tissues/Bones: Benign-appearing axillary lymph nodes are present bilaterally.   No acute soft tissue or osseous abnormality is present. Abdomen/Pelvis: Organs: Diffuse hepatic steatosis is present. Spleen is normal in size. The gallbladder, pancreas, adrenal glands, and kidneys appear normal. GI/Bowel: Stomach is unremarkable. Small bowel appears normal without evidence of obstruction. The appendix is normal.  Sigmoid diverticulosis is present without evidence of diverticulitis. Pelvis: Urinary bladder and prostate gland appear grossly normal.  Bilateral inguinal hernias are present containing fat. Peritoneum/Retroperitoneum: No free fluid or free air is present within the abdomen or pelvis. Scattered mesenteric lymph nodes are present, and are nonspecific. No retroperitoneal adenopathy is present. No aneurysm formation is present. Bones/Soft Tissues: No acute osseous abnormality is present. 1. No pathological adenopathy present within the chest 2. No evidence of acute cardiopulmonary disease 3. Scattered, nonspecific mesenteric lymph nodes. Differential considerations would include mesenteric panniculitis. Follow-up imaging in 6 months recommended to confirm stability.  4. Diffuse hepatic steatosis         Problem List  Patient Active Problem List   Diagnosis    ADHD (attention deficit hyperactivity disorder)    Tourette's syndrome    Tachycardia    GERD (gastroesophageal reflux disease)    HTN (hypertension)    Dysthymia    Cellulitis of nose    MRSA (methicillin resistant staph aureus) culture positive    Insomnia    Obesity    Abscess of nose    Rash       IMPRESSION/RECOMMENDATIONS:    Bilateral erythematous rash-2 patches on his lower legs/elevated ptt     Does not seem to kali   pts rash seems non specific as an initial impression   He has a normal plt count   Cbc does not suggest a blood prob related to wbc/etc   ptt elevated-may be false will repeat but with mixing study and lupus anticoagulant as this could explain the elevated ptt-as he has no bleeding hx  THis does not strike me as a factor 8 inhiitor clinically-this is much more like exaggerated bruises or spontaneous bleeding thus clinical suspicion is low for a bleeding diathesis -my suspect is that he will have a pos lupus anticoagulant for the elevated ptt     ? Vasculitis? -I ordered some labs for this -  Post infecious?  History of diarrhea but not severe  This does not seem cellulitc to me -  Does not remind me of erythema nodosum but to be sure will make srue no adenopathy -scans ordered -this may be overkill but at times this can be sneaky -and sometimes erythema nodusum can be associated with lymphoma -  Anca/nan ordered   I do not suspect Henoch-schonlein purpura based on exam and neg other findings  IF persists -skin bx but this can only usually happen as outpt

## 2021-08-13 NOTE — ED NOTES
[facility] for transport team 0130 per [facility] [place] [facility]. Pt and family updated.       Janiya Cole RN  08/12/21 7571

## 2021-08-13 NOTE — H&P
H & P dictated  227 [id number]  Acute purpuric rash, eruptions, symmetrical on both LE above the ankle. Cause not clear. Mild cellulitis  Fragile X chromosome,  ADHD  Autism,  Tourette's syndrome  Get ID and Hem/Onc consult, resume home meds. Rest of therapy depend upon Hem and ID eval, assessment.   Dr Phu Oro

## 2021-08-13 NOTE — PROGRESS NOTES
Patient transferred from [facility] ED. Patient ambulated from stretcher to bed without assistance. VSS. Mother will be at bedside with patient. Patient alert and orient, call light in reach. will continue to monitor.

## 2021-08-14 VITALS
BODY MASS INDEX: 30.77 KG/M2 | HEART RATE: 81 BPM | WEIGHT: 227.2 LBS | RESPIRATION RATE: 16 BRPM | OXYGEN SATURATION: 89 % | SYSTOLIC BLOOD PRESSURE: 134 MMHG | HEIGHT: 72 IN | TEMPERATURE: 97.7 F | DIASTOLIC BLOOD PRESSURE: 88 MMHG

## 2021-08-14 LAB
ANION GAP SERPL CALCULATED.3IONS-SCNC: 12 MMOL/L (ref 3–16)
ANTI-NUCLEAR ANTIBODY (ANA): NEGATIVE
APTT: 46.8 SEC (ref 26.2–38.6)
BASOPHILS ABSOLUTE: 0 K/UL (ref 0–0.2)
BASOPHILS RELATIVE PERCENT: 0.8 %
BUN BLDV-MCNC: 15 MG/DL (ref 7–20)
CALCIUM SERPL-MCNC: 8.7 MG/DL (ref 8.3–10.6)
CHLORIDE BLD-SCNC: 104 MMOL/L (ref 99–110)
CO2: 22 MMOL/L (ref 21–32)
CREAT SERPL-MCNC: 0.6 MG/DL (ref 0.9–1.3)
EOSINOPHILS ABSOLUTE: 0.1 K/UL (ref 0–0.6)
EOSINOPHILS RELATIVE PERCENT: 3.4 %
GFR AFRICAN AMERICAN: >60
GFR NON-AFRICAN AMERICAN: >60
GLUCOSE BLD-MCNC: 115 MG/DL (ref 70–99)
HCT VFR BLD CALC: 42.3 % (ref 40.5–52.5)
HEMOGLOBIN: 15 G/DL (ref 13.5–17.5)
INR BLD: 1.1 (ref 0.88–1.12)
LYMPHOCYTES ABSOLUTE: 1.4 K/UL (ref 1–5.1)
LYMPHOCYTES RELATIVE PERCENT: 40.9 %
MCH RBC QN AUTO: 30.4 PG (ref 26–34)
MCHC RBC AUTO-ENTMCNC: 35.4 G/DL (ref 31–36)
MCV RBC AUTO: 85.9 FL (ref 80–100)
MONOCYTES ABSOLUTE: 0.3 K/UL (ref 0–1.3)
MONOCYTES RELATIVE PERCENT: 10.2 %
NEUTROPHILS ABSOLUTE: 1.5 K/UL (ref 1.7–7.7)
NEUTROPHILS RELATIVE PERCENT: 44.7 %
PDW BLD-RTO: 14.8 % (ref 12.4–15.4)
PLATELET # BLD: 189 K/UL (ref 135–450)
PMV BLD AUTO: 6.4 FL (ref 5–10.5)
POTASSIUM REFLEX MAGNESIUM: 3.9 MMOL/L (ref 3.5–5.1)
PROTHROMBIN TIME: 12.5 SEC (ref 9.9–12.7)
PTT 1-HOUR, 1:1 MIX: 39.8 SEC (ref 24.1–34.9)
PTT 1:1 MIX IMMEDIATE: 40.2 SEC (ref 24.1–34.9)
PTT MIXING STUDY INDICATED: YES
RBC # BLD: 4.92 M/UL (ref 4.2–5.9)
SODIUM BLD-SCNC: 138 MMOL/L (ref 136–145)
STREP PNEUMONIAE ANTIGEN, URINE: NORMAL
THROMBIN TIME: 13.4 SECS (ref 12.1–17.5)
WBC # BLD: 3.3 K/UL (ref 4–11)

## 2021-08-14 PROCEDURE — 85025 COMPLETE CBC W/AUTO DIFF WBC: CPT

## 2021-08-14 PROCEDURE — G0378 HOSPITAL OBSERVATION PER HR: HCPCS

## 2021-08-14 PROCEDURE — 2580000003 HC RX 258: Performed by: INTERNAL MEDICINE

## 2021-08-14 PROCEDURE — 36415 COLL VENOUS BLD VENIPUNCTURE: CPT

## 2021-08-14 PROCEDURE — 6370000000 HC RX 637 (ALT 250 FOR IP): Performed by: INTERNAL MEDICINE

## 2021-08-14 PROCEDURE — 96372 THER/PROPH/DIAG INJ SC/IM: CPT

## 2021-08-14 PROCEDURE — 6360000002 HC RX W HCPCS: Performed by: INTERNAL MEDICINE

## 2021-08-14 PROCEDURE — 80048 BASIC METABOLIC PNL TOTAL CA: CPT

## 2021-08-14 RX ORDER — DICYCLOMINE HYDROCHLORIDE 10 MG/1
10 CAPSULE ORAL EVERY 6 HOURS PRN
Qty: 20 CAPSULE | Refills: 0 | Status: SHIPPED | OUTPATIENT
Start: 2021-08-14

## 2021-08-14 RX ORDER — OMEPRAZOLE 20 MG/1
20 CAPSULE, DELAYED RELEASE ORAL DAILY
Qty: 30 CAPSULE | Refills: 10 | Status: SHIPPED | OUTPATIENT
Start: 2021-08-14

## 2021-08-14 RX ADMIN — DESVENLAFAXINE SUCCINATE 100 MG: 50 TABLET, EXTENDED RELEASE ORAL at 08:13

## 2021-08-14 RX ADMIN — ATOMOXETINE HYDROCHLORIDE 80 MG: 40 CAPSULE ORAL at 08:13

## 2021-08-14 RX ADMIN — PANTOPRAZOLE SODIUM 40 MG: 40 TABLET, DELAYED RELEASE ORAL at 05:30

## 2021-08-14 RX ADMIN — ENOXAPARIN SODIUM 40 MG: 40 INJECTION SUBCUTANEOUS at 08:13

## 2021-08-14 RX ADMIN — QUETIAPINE FUMARATE 50 MG: 25 TABLET ORAL at 08:13

## 2021-08-14 RX ADMIN — Medication 10 ML: at 08:13

## 2021-08-14 RX ADMIN — ATENOLOL 25 MG: 25 TABLET ORAL at 08:13

## 2021-08-14 ASSESSMENT — PAIN SCALES - GENERAL
PAINLEVEL_OUTOF10: 0
PAINLEVEL_OUTOF10: 0

## 2021-08-14 ASSESSMENT — PAIN DESCRIPTION - PAIN TYPE: TYPE: ACUTE PAIN

## 2021-08-14 ASSESSMENT — PAIN DESCRIPTION - LOCATION: LOCATION: LEG

## 2021-08-14 ASSESSMENT — PAIN DESCRIPTION - ORIENTATION: ORIENTATION: RIGHT;LEFT

## 2021-08-14 NOTE — DISCHARGE SUMMARY
[address] [facility]                               DISCHARGE SUMMARY    PATIENT NAME: Saroj Bennett                    :        1990  MED REC NO:   [id number]                          ROOM:       4127  ACCOUNT NO:   [de-identified]                           ADMIT DATE: 2021  PROVIDER:     Jhonathan Damon MD                  DISCHARGE DATE:  2021        DISCHARGE DIAGNOSES:  Acute purpuric rash on both lower extremities,  TTP, Schonlein-Henoch purpura was ruled out, ADHD, Tourette syndrome,  hepatic steatosis, GERD, borderline hypertension, obesity. The patient  also has fragile X chromosome and autism. DISCHARGE SUMMARY:  This 81-year-old male patient came to the emergency  room because of the acute rash on both lower extremities behind the  legs, above the ankles. It happened over two days. The patient was  afebrile. No history of bug bites or exposure to any chemicals. The  patient was seen by Hematology. TTP, Schonlein-Henoch purpura, other  serious illnesses were ruled out. The patient had a minor elevated PTP. The patient was afebrile, was also seen by ID. It was not cellulitis. At the present time, the rash cause is unknown. The patient is going to  follow up with Dermatology as outpatient, needs a punch biopsy. The  patient remained afebrile, doing fine. Antibiotic was stopped. The  patient can be discharged and follow up as outpatient basis in one week,  follow up with PCP and Dermatology. CONDITION ON DISCHARGE:  Stable. COMPLICATIONS:  None. DISCHARGE MEDICATIONS:  See the reconciliation sheet. No current facility-administered medications for this encounter.      Current Outpatient Medications   Medication Sig Dispense Refill    dicyclomine (BENTYL) 10 MG capsule Take 1 capsule by mouth every 6 hours as needed (cramps) 20 capsule 0    omeprazole (PRILOSEC) 20 MG delayed release capsule Take 1 capsule by mouth Daily 30 capsule 10    gemfibrozil (LOPID) 600 MG tablet Take 600 mg by mouth 2 times daily (before meals)      QUEtiapine (SEROQUEL) 50 MG tablet Take 50 mg by mouth 2 times daily       desvenlafaxine succinate (PRISTIQ) 100 MG TB24 extended release tablet Take 1 tablet by mouth daily 30 tablet 11    atomoxetine (STRATTERA) 80 MG capsule Take 1 capsule by mouth daily 30 capsule 11    atenolol (TENORMIN) 25 MG tablet Take 1 tablet by mouth daily 30 tablet 5     FOLLOWUP:  The patient will follow up with PCP and Dermatology. I spent more than 30 minutes with the discharge instruction and  paperwork.         Sergio Pedersen MD    D: 08/14/2021 7:34:03       T: 08/14/2021 16:20:21     JOSE [email or web address]  Job#: [id number]     Doc#: [id number]    CC:

## 2021-08-14 NOTE — PROGRESS NOTES
All verbal and written discharge instructions given to pt and pt's mother at bedside regarding new meds and changes in home meds, and follow up appointment. Pt and family verbalized understandings. Denies other needs at discharge.  Electronically signed by Amilcar Gardner RN on 8/14/2021 at 11:32 AM

## 2021-08-14 NOTE — PROGRESS NOTES
ONCOLOGY HEMATOLOGY CARE  PROGRESS NOTE    SUBJECTIVE:       Pt is eating breakfast very well. Mother at bedside, reports that his spots on his legs are no worse, no spots any place else, no epistaxis, gum bleeding, hematemesis, rectal bleeding, gross hematuria, but she said his urinalysis shows microscopic hematuria. She also says that when is outside in the sun, he gets redness under his eyes on both cheeks, and when he is in shower, his feet turn red, no pain. She is a patient of Dr. Justen Akins, appointment on Mon. PHYSICAL EXAM:       /88   Pulse 81   Temp 97.7 °F (36.5 °C) (Oral)   Resp 16   Ht 6' (1.829 m)   Wt 227 lb 3.2 oz (103.1 kg)   SpO2 (!) 89%   BMI 30.81 kg/m²     General appearance: Alert and cooperative. Appears stated age. Comfortable. Obese  Head: Normocephalic, without obvious abnormality, atraumatic  EENT:  No icterus. No mucositis. Neck: No JVD. Lymph Nodes:  No palpable lymph nodes in the cervical, supraclavicular, axillary areas. Extremities: without cyanosis, clubbing, edema  Skin: Red patchy flat ?plaques on medial aspect of both legs just above ankles, no tenderness, does not jessica, not raised. Musculoskeletal:  No joint swelling, deformities, tenderness, erythema. Neuro:  Alert and oriented. Cranial nerves are intact. He doesn't really answer questions, mostly his mother does.   Psychiatric:  Normal    MEDS:     Current Facility-Administered Medications   Medication Dose Route Frequency Provider Last Rate Last Admin    pantoprazole (PROTONIX) tablet 40 mg  40 mg Oral QAM AC Carmen Blackwell MD   40 mg at 08/14/21 0530    dicyclomine (BENTYL) capsule 10 mg  10 mg Oral Q6H PRN Carmen Blackwell MD        desvenlafaxine succinate (PRISTIQ) extended release tablet 100 mg  100 mg Oral Daily Carmen Blackwell MD   100 mg at 08/14/21 0813    atomoxetine (STRATTERA) capsule 80 mg  80 mg Oral Daily Carmen Blackwell MD   80 mg at 08/14/21 0813    atenolol (TENORMIN) tablet 25 mg  25 mg Oral Daily Kylah López MD   25 mg at 08/14/21 0813    sodium chloride flush 0.9 % injection 10 mL  10 mL Intravenous 2 times per day Kylah López MD   10 mL at 08/14/21 0813    sodium chloride flush 0.9 % injection 10 mL  10 mL Intravenous PRN Kylah López MD        0.9 % sodium chloride infusion  25 mL Intravenous PRN Kylah López MD        ondansetron [facility]) injection 4 mg  4 mg Intravenous Q4H PRN Kylah López MD        polyethylene glycol [facility]) packet 17 g  17 g Oral Daily PRN Kylah López MD        acetaminophen (TYLENOL) tablet 650 mg  650 mg Oral Q4H PRN Kylah López MD        Or    acetaminophen (TYLENOL) suppository 650 mg  650 mg Rectal Q4H PRN Kylah López MD        enoxaparin (LOVENOX) injection 40 mg  40 mg Subcutaneous Daily Mavis Parents, MD   40 mg at 08/14/21 0813    QUEtiapine (SEROQUEL) tablet 50 mg  50 mg Oral BID Kylah López MD   50 mg at 08/14/21 0813    iohexol (OMNIPAQUE 240) injection 50 mL  50 mL Intravenous ONCE PRN Saba Niño MD           LABS:     CBC:   Lab Results   Component Value Date    WBC 3.3 (L) 08/14/2021    HGB 15.0 08/14/2021    HCT 42.3 08/14/2021    MCV 85.9 08/14/2021     08/14/2021    LYMPHOPCT 40.9 08/14/2021    RBC 4.92 08/14/2021    MCH 30.4 08/14/2021    MCHC 35.4 08/14/2021    RDW 14.8 08/14/2021    NEUTOPHILPCT 44.7 08/14/2021    MONOPCT 10.2 08/14/2021    BASOPCT 0.8 08/14/2021    NEUTROABS 1.5 (L) 08/14/2021    LYMPHSABS 1.4 08/14/2021    MONOSABS 0.3 08/14/2021    EOSABS 0.1 08/14/2021    BASOSABS 0.0 08/14/2021       CMP:   Lab Results   Component Value Date     08/14/2021    K 3.9 08/14/2021     08/14/2021    CO2 22 08/14/2021    BUN 15 08/14/2021    CREATININE 0.6 08/14/2021    GLUCOSE 115 08/14/2021    CALCIUM 8.7 08/14/2021    PROT 7.2 08/13/2021    PROT 7.2 01/14/2013    LABALBU 4.8 09/25/2020    BILITOT 0.4 09/25/2020    ALKPHOS 62 09/25/2020    AST 40 09/25/2020    ALT 69 09/25/2020      Results for Ivory Mclean (MRN [id number]) as of 8/14/2021 08:34   Ref. Range 8/13/2021 16:25   Rheumatoid Factor Latest Ref Range: <14 IU/mL <10.0       Results for Ivory Mclean (MRN [id number]) as of 8/14/2021 08:34   Ref. Range 8/13/2021 16:25   Prothrombin Time Latest Ref Range: 9.9 - 12.7 sec 12.5   INR Latest Ref Range: 0.88 - 1.12  1.10   Fibrinogen Latest Ref Range: 200 - 397 mg/dL 317   aPTT Latest Ref Range: 26.2 - 38.6 sec 46.8 (H)     IMAGING:         ASSESSMENT:         Patient Active Problem List   Diagnosis Code    ADHD (attention deficit hyperactivity disorder) F90.9    Tourette's syndrome F95.2    Tachycardia R00.0    GERD (gastroesophageal reflux disease) K21.9    HTN (hypertension) I10    Dysthymia F34.1    Cellulitis of nose J34.0    MRSA (methicillin resistant staph aureus) culture positive Z22.322    Insomnia G47.00    Obesity E66.9    Abscess of nose J34.0    Rash R21     Suspect he may have antiphospholipid antibody and may have discoid lupus. PLAN:       OK to discharge. Followup with Dr. Cheryl Mchugh  Cautioned mother that labs will not be back until later next week.     Mary Ann Dickerson MD

## 2021-08-14 NOTE — DISCHARGE INSTR - COC
Continuity of Care Form    Patient Name: Netta Waite   :  1990  MRN:  [id number]    Admit date:  2021  Discharge date:  ***    Code Status Order: Full Code   Advance Directives:      Admitting Physician:  Guillermina Muhammad MD  PCP: MALIA Mcneil CNP    Discharging Nurse: [facility] Unit/Room#: X3K-7211/8197-00  Discharging Unit Phone Number: ***    Emergency Contact:   Extended Emergency Contact Information  Primary Emergency Contact: [place]  Address: [facility]  Home Phone: [phone]  Relation: Parent    Past Surgical History:  Past Surgical History:   Procedure Laterality Date    EYE SURGERY Left     TYMPANOSTOMY TUBE PLACEMENT  @ age 4       Immunization History:   Immunization History   Administered Date(s) Administered    COVID-19, Moderna, PF, 100mcg/0.5mL 2021, 2021    Influenza 10/01/2013       Active Problems:  Patient Active Problem List   Diagnosis Code    ADHD (attention deficit hyperactivity disorder) F90.9    Tourette's syndrome F95.2    Tachycardia R00.0    GERD (gastroesophageal reflux disease) K21.9    HTN (hypertension) I10    Dysthymia F34.1    Cellulitis of nose J34.0    MRSA (methicillin resistant staph aureus) culture positive Z22.322    Insomnia G47.00    Obesity E66.9    Abscess of nose J34.0    Rash R21       Isolation/Infection:   Isolation            No Isolation          Patient Infection Status       None to display            Nurse Assessment:  Last Vital Signs: /85   Pulse 85   Temp 97.6 °F (36.4 °C) (Oral)   Resp 18   Ht 6' (1.829 m)   Wt 227 lb 3.2 oz (103.1 kg)   SpO2 94%   BMI 30.81 kg/m²     Last documented pain score (0-10 scale): Pain Level: 0  Last Weight:   Wt Readings from Last 1 Encounters:   21 227 lb 3.2 oz (103.1 kg)     Mental Status:  {IP PT MENTAL STATUS::::0}    IV Access:  [address] IV ACCESS:122466203:::0}    Nursing Mobility/ADLs:  Walking   {[facility] DME ADLs:056155798:::0}  Transfer  {[facility] DME ADLs:386079166:::0}  Bathing  {[facility] DME ADLs:814140458:::0}  Dressing  {[facility] DME ADLs:340026167:::0}  Toileting  {[facility] DME ADLs:643235600:::0}  Feeding  {[facility] DME ADLs:321902624:::0}  Med Admin  {[facility]P DME ADLs:589742317:::0}  Med Delivery   { RUPERTO MED Delivery:298367708:::0}    Wound Care Documentation and Therapy:        Elimination:  Continence: Bowel: {YES / AW:68511}  Bladder: {YES / VL:99627}  Urinary Catheter: {Urinary Catheter:896778297:::0}   Colostomy/Ileostomy/Ileal Conduit: {YES / KT:28121}       Date of Last BM: ***    Intake/Output Summary (Last 24 hours) at 2021 0726  Last data filed at 20216  Gross per 24 hour   Intake 2270 ml   Output --   Net 2270 ml     I/O last 3 completed shifts:   In: 2270 [P.O.:2260; I.V.:10]  Out: -     Safety Concerns:     [address] DocDoc Safety Concerns:548560844:::0}    Impairments/Disabilities:      [address] DocDoc Impairments/Disabilities:341693763:::0}    Nutrition Therapy:  Current Nutrition Therapy:   [address] RUPERTO Diet List:571699355:::0}    Routes of Feeding: {[facility] DME Other Feedings:082319191:::0}  Liquids: {Slp liquid thickness:67243}  Daily Fluid Restriction: {[facility]P DME Yes amt example:414034971:::0}  Last Modified Barium Swallow with Video (Video Swallowing Test): {Done Not Done AKNP:833482167:::9}    Treatments at the Time of Hospital Discharge:   Respiratory Treatments: ***  Oxygen Therapy:  {Therapy; copd oxygen:51399:::0}  Ventilator:    { CC Vent List:423287802:::0}    Rehab Therapies: {THERAPEUTIC INTERVENTION:6479953320}  Weight Bearing Status/Restrictions: [address]  Weight Bearin:::0}  Other Medical Equipment (for information only, NOT a DME order):  {EQUIPMENT:492095011}  Other Treatments: ***    Patient's personal belongings (please select all that are sent with patient):  {CHP DME Belongings:383325181:::0}    RN SIGNATURE:  {Esignature:236764577:::0}    CASE MANAGEMENT/SOCIAL WORK SECTION    Inpatient Status Date: ***    Readmission Risk Assessment Score:  Readmission Risk              Risk of Unplanned Readmission:  9           Discharging to Facility/ Agency   Name:   Address:  Phone:  Fax:    Dialysis Facility (if applicable)   Name:  Address:  Dialysis Schedule:  Phone:  Fax:    / signature: {Esignature:793689015:::0}    PHYSICIAN SECTION    Prognosis: Good    Condition at Discharge: Stable    Rehab Potential (if transferring to Rehab): Good    Recommended Labs or Other Treatments After Discharge: f/u by dermatology as OP and need skin Bx,    Physician Certification: I certify the above information and transfer of Lukas Arteaga  is necessary for the continuing treatment of the diagnosis listed and that he requires {Admit to Appropriate Level of Care:99649:::0} for {GREATER/LESS:025454388} 30 days.      Update Admission H&P: No change in H&P    PHYSICIAN SIGNATURE:  Electronically signed by Nora Gilford, MD on 8/14/21 at 7:27 AM EDT

## 2021-08-16 LAB
ALBUMIN SERPL-MCNC: 3.3 G/DL (ref 3.1–4.9)
ALPHA-1-GLOBULIN: 0.3 G/DL (ref 0.2–0.4)
ALPHA-2-GLOBULIN: 0.8 G/DL (ref 0.4–1.1)
ANTICARDIOLIPIN IGG ANTIBODY: <10 GPL (ref 0–14)
BETA GLOBULIN: 1.8 G/DL (ref 0.9–1.6)
BETA-2 MICROGLOBULIN: 1.3 MG/L (ref 1.1–2.4)
CARDIOLIPIN AB IGM: <10 MPL (ref 0–12)
COAG INTERPRETATION: NORMAL
GAMMA GLOBULIN: 1 G/DL (ref 0.6–1.8)
SPE/IFE INTERPRETATION: NORMAL
TOTAL PROTEIN: 7.2 G/DL (ref 6.4–8.2)

## 2021-08-17 LAB
ANCA IFA: NORMAL
BLOOD CULTURE, ROUTINE: NORMAL
CULTURE, BLOOD 2: NORMAL
DRVVT CONFIRMATION TEST: NORMAL RATIO
DRVVT SCREEN: 39 SEC (ref 33–44)
DRVVT,DIL: NORMAL SEC (ref 33–44)
HEXAGONAL PHOSPHOLIPID NEUTRALIZAT TEST: NORMAL
KAPPA, FREE LIGHT CHAINS, SERUM: 6.65 MG/L (ref 3.3–19.4)
KAPPA/LAMBDA RATIO: 0.8 (ref 0.26–1.65)
KAPPA/LAMBDA TEST COMMENT: NORMAL
LAMBDA, FREE LIGHT CHAINS, SERUM: 8.36 MG/L (ref 5.71–26.3)
LUPUS ANTICOAG INTERP: NORMAL
PLT NEUTA: NORMAL
PT D: 13 SEC (ref 12–15.5)
PTT D: 47 SEC (ref 32–48)
PTT-D CORR REFLEX: NORMAL SEC (ref 32–48)
PTT-HEPARIN NEUTRALIZED: NORMAL SEC (ref 32–48)
REPTILASE TIME: NORMAL SEC
THROMBIN TIME: NORMAL SEC (ref 14.7–19.5)

## 2023-12-25 ENCOUNTER — HOSPITAL ENCOUNTER (EMERGENCY)
Age: 33
Discharge: HOME | End: 2023-12-25
Attending: STUDENT IN AN ORGANIZED HEALTH CARE EDUCATION/TRAINING PROGRAM
Payer: COMMERCIAL

## 2023-12-25 VITALS
WEIGHT: 219 LBS | BODY MASS INDEX: 32.44 KG/M2 | RESPIRATION RATE: 16 BRPM | SYSTOLIC BLOOD PRESSURE: 124 MMHG | DIASTOLIC BLOOD PRESSURE: 79 MMHG | HEART RATE: 97 BPM | TEMPERATURE: 97.5 F | OXYGEN SATURATION: 96 % | HEIGHT: 69 IN

## 2023-12-25 DIAGNOSIS — R04.0 ACUTE ANTERIOR EPISTAXIS: Primary | ICD-10-CM

## 2023-12-25 PROCEDURE — 6370000000 HC RX 637 (ALT 250 FOR IP): Performed by: STUDENT IN AN ORGANIZED HEALTH CARE EDUCATION/TRAINING PROGRAM

## 2023-12-25 PROCEDURE — 99283 EMERGENCY DEPT VISIT LOW MDM: CPT

## 2023-12-25 PROCEDURE — 30901 CONTROL OF NOSEBLEED: CPT

## 2023-12-25 RX ORDER — ECHINACEA PURPUREA EXTRACT 125 MG
1 TABLET ORAL PRN
Qty: 1 EACH | Refills: 0 | Status: SHIPPED | OUTPATIENT
Start: 2023-12-25

## 2023-12-25 RX ORDER — OXYMETAZOLINE HYDROCHLORIDE 0.05 G/100ML
2 SPRAY NASAL 2 TIMES DAILY
Qty: 20 ML | Refills: 0 | Status: SHIPPED | OUTPATIENT
Start: 2023-12-25 | End: 2024-02-13

## 2023-12-25 RX ADMIN — Medication 1 EACH: at 21:19

## 2023-12-26 NOTE — ED NOTES
silver nitrate applicators applicator was applied by the provider to prevent bleed. Pt cleared to discharge, Departure Condition was Good , Ambulated. Discharge instructions reviewed; Follow-up care advised; Pain management discussed; Medications discussed;  Patient verbalized understanding; Family verbalized understanding

## 2023-12-26 NOTE — ED PROVIDER NOTES
[address] [facility],[address] ENCOUNTER        Pt Name: Rob Holliday  MRN: [id number]  [address] [facility] 1990  Date of evaluation: 12/25/2023  Provider: Vishal Huynh MD  PCP: MALIA Sibley CNP  Note Started: 3:09 AM EST 12/26/23    CHIEF COMPLAINT       Epistaxis. HISTORY OF PRESENT ILLNESS: 1 or more Elements     History from : Patient    Limitations to history : developmental delay 2/2 Fragile X syndrome    Rob Holliday is a 29 y.o. male who presents with epistaxis, described as a recurrent problem. Denies trauma. Has had 5 self resolving episodes from the L nare. Usual spot of bleeding per mom in the anterior aspect of the nare. Typically requires cauterization. Symptoms not otherwise alleviated or exacerbated by other factors. Nursing Notes were all reviewed and agreed with or any disagreements were addressed in the HPI. REVIEW OF SYSTEMS :      Positives and Pertinent negatives as per HPI. ROS otherwise unremarkable.     SURGICAL HISTORY     Past Surgical History:   Procedure Laterality Date    EYE SURGERY Left     TYMPANOSTOMY TUBE PLACEMENT  @ age 4        [facility]       Discharge Medication List as of 12/25/2023  9:21 PM        CONTINUE these medications which have NOT CHANGED    Details   dicyclomine (BENTYL) 10 MG capsule Take 1 capsule by mouth every 6 hours as needed (cramps), Disp-20 capsule, R-0Normal      omeprazole (PRILOSEC) 20 MG delayed release capsule Take 1 capsule by mouth Daily, Disp-30 capsule, R-10Normal      gemfibrozil (LOPID) 600 MG tablet Take 600 mg by mouth 2 times daily (before meals)Historical Med      QUEtiapine (SEROQUEL) 50 MG tablet Take 50 mg by mouth 2 times daily Historical Med      desvenlafaxine succinate (PRISTIQ) 100 MG TB24 extended release tablet Take 1 tablet by mouth daily, Disp-30 tablet, R-11      atomoxetine (STRATTERA) 80 MG capsule Take 1 capsule by mouth daily, Disp-30 capsule, R-11

## 2024-02-28 ENCOUNTER — OFFICE VISIT (OUTPATIENT)
Dept: ENT CLINIC | Age: 34
End: 2024-02-28

## 2024-02-28 VITALS
DIASTOLIC BLOOD PRESSURE: 82 MMHG | WEIGHT: 218 LBS | SYSTOLIC BLOOD PRESSURE: 119 MMHG | BODY MASS INDEX: 32.29 KG/M2 | RESPIRATION RATE: 16 BRPM | TEMPERATURE: 96.8 F | HEIGHT: 69 IN | HEART RATE: 83 BPM | OXYGEN SATURATION: 95 %

## 2024-02-28 DIAGNOSIS — J34.89 NASAL OBSTRUCTION: ICD-10-CM

## 2024-02-28 DIAGNOSIS — J34.2 DEVIATED NASAL SEPTUM: Primary | ICD-10-CM

## 2024-02-28 DIAGNOSIS — R04.0 EPISTAXIS: ICD-10-CM

## 2024-02-28 NOTE — PROGRESS NOTES
the bleeding.  Significant leftward septal deviation. There was no evidence of  bleeding posteriorly. There were no masses or other lesions noted.  Hemostasis was obtained. The patient tolerated well. No complications.     Septum:   Other:  The inferior and middle turbinates were examined.  The middle meatus, and sphenoethmoid recess was examined bilaterally.  The endoscope was used to evaluate for any mass lesions posteriorly which could be the cause of epistaxis and there were no sites of bleeding or other concerning lesions.    I attest that I was present for and did the entire procedure myself.         This note was generated completely or in part utilizing Dragon dictation speech recognition software.  Occasionally, words are mistranscribed and despite editing, the text may contain inaccuracies due to incorrect word recognition.  If further clarification is needed please contact the office at [phone].    An electronic signature was used to authenticate this note.    --Mark Ornelas MD

## 2025-01-30 ENCOUNTER — OFFICE VISIT (OUTPATIENT)
Dept: ENT CLINIC | Age: 35
End: 2025-01-30
Payer: MEDICARE

## 2025-01-30 VITALS
HEART RATE: 82 BPM | SYSTOLIC BLOOD PRESSURE: 118 MMHG | BODY MASS INDEX: 29.77 KG/M2 | DIASTOLIC BLOOD PRESSURE: 80 MMHG | HEIGHT: 69 IN | WEIGHT: 201 LBS | OXYGEN SATURATION: 96 %

## 2025-01-30 DIAGNOSIS — J04.0 LARYNGITIS, ACUTE: Primary | ICD-10-CM

## 2025-01-30 DIAGNOSIS — R49.0 DYSPHONIA: ICD-10-CM

## 2025-01-30 PROCEDURE — 3074F SYST BP LT 130 MM HG: CPT | Performed by: STUDENT IN AN ORGANIZED HEALTH CARE EDUCATION/TRAINING PROGRAM

## 2025-01-30 PROCEDURE — 99214 OFFICE O/P EST MOD 30 MIN: CPT | Performed by: STUDENT IN AN ORGANIZED HEALTH CARE EDUCATION/TRAINING PROGRAM

## 2025-01-30 PROCEDURE — 3079F DIAST BP 80-89 MM HG: CPT | Performed by: STUDENT IN AN ORGANIZED HEALTH CARE EDUCATION/TRAINING PROGRAM

## 2025-01-30 RX ORDER — FLUTICASONE PROPIONATE 50 MCG
1 SPRAY, SUSPENSION (ML) NASAL DAILY PRN
COMMUNITY

## 2025-01-30 RX ORDER — OMEPRAZOLE 40 MG/1
CAPSULE, DELAYED RELEASE ORAL
Qty: 30 CAPSULE | Refills: 1 | Status: SHIPPED | OUTPATIENT
Start: 2025-01-30

## 2025-01-30 RX ORDER — NYSTATIN 100000 [USP'U]/ML
500000 SUSPENSION ORAL 4 TIMES DAILY
Qty: 200 ML | Refills: 0 | Status: SHIPPED | OUTPATIENT
Start: 2025-01-30 | End: 2025-02-09

## 2025-01-30 RX ORDER — PREDNISONE 10 MG/1
40 TABLET ORAL DAILY
Qty: 28 TABLET | Refills: 0 | Status: SHIPPED | OUTPATIENT
Start: 2025-01-30 | End: 2025-02-06

## 2025-01-30 NOTE — PROGRESS NOTES
[facility]  DIVISION OF OTOLARYNGOLOGY- HEAD & NECK SURGERY  CONSULT      Donald Rodriguez (:  1990) is a 34 y.o. male, here for evaluation of the following chief complaint(s):  Hoarse (3 weeks ) and Sinus Problem      ASSESSMENT/PLAN:  1. Laryngitis, acute  2. Dysphonia        This is a very pleasant 34 y.o. male here today for evaluation of the the above-noted complaints.      Assessment & Plan  1. Laryngitis.  He has been experiencing laryngitis for nearly 3 weeks. The condition may be transitioning from acute to chronic. There is no indication of a bacterial infection. A prescription for nystatin swish and swallow will be provided to address potential thrush or fungal infection. A 7-day course of steroids will be initiated, followed by a tapering schedule. His omeprazole dosage will be increased to 40 mg once daily for the next 4 weeks. The potential risks associated with steroid use, including mood changes, stomach ulcers, and hip fractures, have been discussed. The risks associated with nystatin and acid reflux medication are considered negligible. If there is no improvement, he should return for further evaluation.    2. Ear infection.  The ear infection has resolved. No current ear pain reported. No further antibiotics are prescribed at this time.    3. Headache.  He reported a major headache yesterday. No specific treatment was discussed for this symptom during the visit.            Medical Decision Making:  The following items were considered in medical decision making:  Independent review of images  Review / order clinical lab tests  Review / order radiology tests  Decision to obtain old records  [facility]    SUBJECTIVE/OBJECTIVE:  HPI    Prior treatment notes:  On exam, the patient has evidence of severely deviated nasal septum to the left and moderate obstruction to the turbinates.  There is also a prominent grouping of

## 2025-02-20 ENCOUNTER — OFFICE VISIT (OUTPATIENT)
Dept: ENT CLINIC | Age: 35
End: 2025-02-20

## 2025-02-20 VITALS
OXYGEN SATURATION: 97 % | HEIGHT: 69 IN | HEART RATE: 96 BPM | DIASTOLIC BLOOD PRESSURE: 78 MMHG | WEIGHT: 207 LBS | BODY MASS INDEX: 30.66 KG/M2 | SYSTOLIC BLOOD PRESSURE: 115 MMHG

## 2025-02-20 DIAGNOSIS — J34.2 DEVIATED NASAL SEPTUM: ICD-10-CM

## 2025-02-20 DIAGNOSIS — R49.0 DYSPHONIA: ICD-10-CM

## 2025-02-20 DIAGNOSIS — J38.3 LESION OF TRUE VOCAL CORD: ICD-10-CM

## 2025-02-20 DIAGNOSIS — R04.0 EPISTAXIS: ICD-10-CM

## 2025-02-20 DIAGNOSIS — J04.0 LARYNGITIS, ACUTE: Primary | ICD-10-CM

## 2025-02-20 NOTE — PROGRESS NOTES
[facility]  DIVISION OF OTOLARYNGOLOGY- HEAD & NECK SURGERY  CONSULT      Donald Rodriguez (:  1990) is a 35 y.o. male, here for evaluation of the following chief complaint(s):  Epistaxis (Right ) and Laryngitis      ASSESSMENT/PLAN:  1. Laryngitis, acute  2. Epistaxis  3. Lesion of true vocal cord  4. Dysphonia  5. Deviated nasal septum          This is a very pleasant 35 y.o. male here today for evaluation of the the above-noted complaints.      Assessment & Plan  Assessment & Plan  1. Epistaxis.  He experienced a nosebleed on the right side, which was previously cauterized a year ago. Examination revealed some scabs and dried blood. Nasal saline was recommended for both nostrils to help with the dryness.  Begin nasal saline gel spray.  Can cauterize the nose on the right if bleeding persist.    2. Laryngitis.  He has been experiencing persistent laryngitis despite treatment with nystatin swish and swallow, increased acid reflux medication, and a prednisone pack. Examination showed inflammation and slight thickening of the vocal cords, but no mass or cancer. A referral to Viridiana Zayas, a speech and language pathologist, will be made for voice therapy exercises to strengthen his voice.                Medical Decision Making:  The following items were considered in medical decision making:  Independent review of images  Review / order clinical lab tests  Review / order radiology tests  Decision to obtain old records  Review and summation of old records as accessed through [facility] if applicable    SUBJECTIVE/OBJECTIVE:  HPI    Prior treatment notes:  On exam, the patient has evidence of severely deviated nasal septum to the left and moderate obstruction to the turbinates.  There is also a prominent grouping of blood vessels along the junction between the anterior and mid nasal septum.  This was cauterized under direct endoscopic visualization.    Lengthy discussion was had with the patient and his

## 2025-02-26 DIAGNOSIS — R49.0 DYSPHONIA: Primary | ICD-10-CM

## 2025-03-03 ENCOUNTER — PROCEDURE VISIT (OUTPATIENT)
Dept: SPEECH THERAPY | Age: 35
End: 2025-03-03
Payer: MEDICARE

## 2025-03-03 DIAGNOSIS — R49.0 DYSPHONIA: Primary | ICD-10-CM

## 2025-03-03 DIAGNOSIS — R49.0 MUSCLE TENSION DYSPHONIA: ICD-10-CM

## 2025-03-03 PROCEDURE — 92507 TX SP LANG VOICE COMM INDIV: CPT | Performed by: SPEECH-LANGUAGE PATHOLOGIST

## 2025-03-03 PROCEDURE — 92524 BEHAVRAL QUALIT ANALYS VOICE: CPT | Performed by: SPEECH-LANGUAGE PATHOLOGIST

## 2025-03-03 PROCEDURE — 31579 LARYNGOSCOPY TELESCOPIC: CPT | Performed by: SPEECH-LANGUAGE PATHOLOGIST

## 2025-03-03 NOTE — PROGRESS NOTES
80% acc given mod cues  Pt will complete laryngeal/general relaxation stretches/exercises w/ 80% acc given mod cues    PLAN OF CARE:   SLP recommended: Yes  Barriers to treatment: Unknown etiology  Potential benefits from rehab include: Improved vocal quality  Frequency: TBD    RECOMMENDATIONS:   Dr. Ornelas was present and reviewed recorded evaluation to assist in diagnosis and provide assessment and plan for treatment.   Follow good vocal hygiene behaviors and precautions, including increasing oral hydration.   Follow dietary precautions and behavioral lifestyle changes regarding laryngopharyngeal reflux, including taking PPI as prescribed by physician.   Voice therapy to focus on re-strengthening and balancing the laryngeal musculature, to promote to open oral front focus, to promote using adequate diaphragmatic breath support to sustain conversational speech.   Pt is a good candidate for further medical evaluation/intervention at the discretion of the treating physician.   Pt to follow up with ENT/Dr. Ornelas.      CPT Code Units Billed Time Billed Today Date of POC Start Re-Certification Date Referring Provider   38638, 64537, 29707 3 Unit Time in: 1130  Time out: 1210  Total time: 40 min 3/3/2025 60 days Dr. Ornelas       Thank you,    Svetlana Zayas MA (Katie), CCC-SLP; [id number]  Voice Specialized Speech-Language Pathologist

## 2025-03-04 ENCOUNTER — TELEPHONE (OUTPATIENT)
Dept: ENT CLINIC | Age: 35
End: 2025-03-04

## 2025-03-04 NOTE — TELEPHONE ENCOUNTER
Pt needs to see nettie again and then see hilary for follow up; need to offer 3/17 then gelpi about 2 weeks after that. STEVENOV (mother) for pt to call to schedule.erika

## 2025-03-24 ENCOUNTER — CLINICAL DOCUMENTATION (OUTPATIENT)
Dept: SPEECH THERAPY | Age: 35
End: 2025-03-24

## 2025-03-24 NOTE — PROGRESS NOTES
[facility] ENT SLP  Late Cancellation/No-show Note  Name: Donald Rodriguez  YOB: 1990  MRN: [id number]  Cancelled visits to date: 1  No-shows to date: 1    OP attendance policy states 2 consecutive no-shows or 3 missed sessions within the last 60 days may result in discharge from therapy. Cancellation less than 12 hours before the appointment time is considered a no-show. Additionally, evaluation or therapy may be unable to be provided if patient arrives late by 15 minutes or more.     For today's appointment patient:  []  Late Cancelled  []  Rescheduled appointment  [x]  No-show     Reason given by patient:  []  Patient ill  []  Conflicting appointment  []  No transportation                          []  Conflict with work  [x]  No reason given  []  Other:                Comments:        Thank you,    Svetlana Zayas MA (Katie), CCC-SLP; [id number]  Speech-Language Pathologist

## 2025-04-14 ENCOUNTER — PROCEDURE VISIT (OUTPATIENT)
Dept: SPEECH THERAPY | Age: 35
End: 2025-04-14
Payer: MEDICARE

## 2025-04-14 DIAGNOSIS — J38.4 VOCAL FOLD EDEMA: ICD-10-CM

## 2025-04-14 DIAGNOSIS — R49.0 DYSPHONIA: Primary | ICD-10-CM

## 2025-04-14 PROCEDURE — 92507 TX SP LANG VOICE COMM INDIV: CPT | Performed by: SPEECH-LANGUAGE PATHOLOGIST

## 2025-04-14 NOTE — PROGRESS NOTES
[facility] ENT  Voice Therapy      Pt Seen for Therapy - Session # 2     Diagnosis/Indication:   Primary: Dysphonia  Secondary: TVF Edema  Tertiary: Glottic Web (?)    Background History:   PMHx of dysphonia w/ acute onset >one month ago; unknown inciting event but persistent since. Seen by PCP + ENT w/ completion of steroids, increased PPI (40 mg), Nystatin and antibiotic (Augmentin for bilateral ear infection) w/o improvement in vocal changes. Characterized dysphonia as roughness + inability to project; denied pain/discomfort but impact on QOL, as difficult to be heard, specifically in louder environments. Denied dyspnea or dysphagia. Additional hx of fragile X and von willebrand syndrome resulting in frequent epistaxis; improved since using Flonase + saline gel spray.     Previous SLP Evaluations:   VLS 3/3/25  VLS revealed diffuse erythematous changes of bilateral TVFs; smooth & straight medial-posterior 2/3rd TVF bilaterally. Observed thick, white tissue changes as anterior 1/3rd extending into anterior commissure; difficult to fully assess subglottic secretions vs possible anterior scarring/webbing d/t reduced tolerance of endoscopy (sensitive gag reflex). Non-clearing despite multiple cues for cough. Glottic closure characterized as incomplete but mucosal wave present via airflow; moderate-severe compensatory supraglottic hyperfunction (LM>AP).     SUBJECTIVE:   Pt returns today after ~6 wks d/t illness; per mom, endorsed mild positive improvements characterized by increased volume however, continued roughness and inability to project. Typically worse in AM and by end of day; low vocal load at baseline.     OBJECTIVE:   Voice Therapy    Goal: Session 2 Session 3 Session 4   Pt will adhere to vocal hygiene protocol during daily life activities w/ 80% acc   Pt adhered to vocal hygiene protocol w/ 70% acc    Endorsed implementing exercise almost daily; mild positive improvements related to baseline voicing but

## 2025-04-23 ENCOUNTER — OFFICE VISIT (OUTPATIENT)
Dept: ENT CLINIC | Age: 35
End: 2025-04-23

## 2025-04-23 VITALS
DIASTOLIC BLOOD PRESSURE: 81 MMHG | WEIGHT: 216 LBS | HEART RATE: 87 BPM | BODY MASS INDEX: 31.99 KG/M2 | SYSTOLIC BLOOD PRESSURE: 129 MMHG | HEIGHT: 69 IN | OXYGEN SATURATION: 96 %

## 2025-04-23 DIAGNOSIS — J04.0 LARYNGITIS, ACUTE: ICD-10-CM

## 2025-04-23 DIAGNOSIS — J38.3 LESION OF TRUE VOCAL CORD: Primary | ICD-10-CM

## 2025-04-23 DIAGNOSIS — Q31.0 ANTERIOR GLOTTIC WEB: ICD-10-CM

## 2025-04-23 DIAGNOSIS — R49.0 DYSPHONIA: ICD-10-CM

## 2025-04-23 NOTE — PROGRESS NOTES
had any further episodes since the last one.  - His current treatment regimen includes the use of a nasal spray and a gel at night.      REVIEW OF SYSTEMS  The following systems were reviewed and revealed the following in addition to any already discussed in the HPI:    PHYSICAL EXAM    GENERAL: No acute distress, alert and oriented, moderate intermittent dysphonia  EYES: EOMI, Anti-icteric  HENT:   Head: Normocephalic and atraumatic.   Face:  Symmetric, facial nerve intact  Right Ear: Normal external ear, normal external auditory canal, intact tympanic membrane with normal mobility and aerated middle ear  Left Ear: Normal external ear, normal external auditory canal, intact tympanic membrane with normal mobility and aerated middle ear  Mouth/Oral Cavity:  normal lips, Uvula is midline, no mucosal lesions, no trismus, fair dentition, normal salivary quality/flow  Oropharynx/Larynx:  normal oropharynx, 1+ tonsils  Nose:Normal external nasal appearance.  Anterior rhinoscopy shows  a deviated septum preventing view posteriorly.  Moderate swelling of turbinates.  Normal mucosa           PROCEDURE  Flexible Laryngoscopy CPT Code 69544 from prior visit, no charge    Pre op: Dysphonia.   Post op: Same  Procedure : Flexible Laryngoscopy  Surgeon: Mark Ornelas MD  Anesthesia: Afrin with 4% lidocaine  Indication: Laryngeal mirror examination was not tolerated due to gag reflex  Description:  The scope was passed along the floor of the right naris to the level of the larynx. There was no evidence of concerning masses or lesions of the base of tongue, vallecula, epiglottis, aryepiglottic folds, arytenoids, false vocal folds, true vocal folds, or pyriform sinuses. True vocal folds exhibited symmetric motion bilaterally without evidence of paralysis or paresis.The scope was removed. The patient tolerated the procedure without difficulty. There were no complications.  Pertinent findings: moderate edema of the vocal cords with